# Patient Record
Sex: MALE | Race: WHITE | Employment: FULL TIME | URBAN - METROPOLITAN AREA
[De-identification: names, ages, dates, MRNs, and addresses within clinical notes are randomized per-mention and may not be internally consistent; named-entity substitution may affect disease eponyms.]

---

## 2024-06-11 ENCOUNTER — HOSPITAL ENCOUNTER (INPATIENT)
Facility: HOSPITAL | Age: 49
LOS: 2 days | Discharge: HOME | DRG: 897 | End: 2024-06-13
Attending: STUDENT IN AN ORGANIZED HEALTH CARE EDUCATION/TRAINING PROGRAM | Admitting: INTERNAL MEDICINE
Payer: COMMERCIAL

## 2024-06-11 ENCOUNTER — APPOINTMENT (OUTPATIENT)
Facility: HOSPITAL | Age: 49
DRG: 897 | End: 2024-06-11
Payer: COMMERCIAL

## 2024-06-11 DIAGNOSIS — E83.42 HYPOMAGNESEMIA: ICD-10-CM

## 2024-06-11 DIAGNOSIS — R79.89 ELEVATED LFTS: ICD-10-CM

## 2024-06-11 DIAGNOSIS — I42.9 CARDIOMYOPATHY, UNSPECIFIED TYPE (HCC): ICD-10-CM

## 2024-06-11 DIAGNOSIS — D64.9 ANEMIA, UNSPECIFIED TYPE: ICD-10-CM

## 2024-06-11 DIAGNOSIS — R42 DIZZINESS: ICD-10-CM

## 2024-06-11 DIAGNOSIS — R79.89 LOW TSH LEVEL: ICD-10-CM

## 2024-06-11 DIAGNOSIS — F10.939 ALCOHOL WITHDRAWAL SYNDROME WITH COMPLICATION (HCC): Primary | ICD-10-CM

## 2024-06-11 PROBLEM — F10.930 ALCOHOL WITHDRAWAL SYNDROME, UNCOMPLICATED (HCC): Status: ACTIVE | Noted: 2024-06-11

## 2024-06-11 PROBLEM — R26.0 ATAXIC GAIT: Status: ACTIVE | Noted: 2024-06-11

## 2024-06-11 PROBLEM — E87.1 HYPONATREMIA: Status: ACTIVE | Noted: 2024-06-11

## 2024-06-11 PROBLEM — I10 HTN (HYPERTENSION), BENIGN: Status: ACTIVE | Noted: 2024-06-11

## 2024-06-11 PROBLEM — D50.9 MICROCYTIC ANEMIA: Status: ACTIVE | Noted: 2024-06-11

## 2024-06-11 PROBLEM — E11.9 DM (DIABETES MELLITUS), TYPE 2 (HCC): Status: ACTIVE | Noted: 2024-06-11

## 2024-06-11 PROBLEM — E78.5 HYPERLIPIDEMIA: Status: ACTIVE | Noted: 2024-06-11

## 2024-06-11 PROBLEM — E03.9 HYPOTHYROIDISM: Status: ACTIVE | Noted: 2024-06-11

## 2024-06-11 LAB
ALBUMIN SERPL-MCNC: 3.3 G/DL (ref 3.5–5)
ALBUMIN/GLOB SERPL: 0.6 (ref 1.1–2.2)
ALP SERPL-CCNC: 167 U/L (ref 45–117)
ALT SERPL-CCNC: 102 U/L (ref 12–78)
ANION GAP SERPL CALC-SCNC: 11 MMOL/L (ref 5–15)
AST SERPL-CCNC: 145 U/L (ref 15–37)
BASOPHILS # BLD: 0 K/UL (ref 0–0.1)
BASOPHILS NFR BLD: 0 % (ref 0–1)
BILIRUB SERPL-MCNC: 0.7 MG/DL (ref 0.2–1)
BUN SERPL-MCNC: 8 MG/DL (ref 6–20)
BUN/CREAT SERPL: 8 (ref 12–20)
CALCIUM SERPL-MCNC: 9.6 MG/DL (ref 8.5–10.1)
CHLORIDE SERPL-SCNC: 96 MMOL/L (ref 97–108)
CO2 SERPL-SCNC: 26 MMOL/L (ref 21–32)
COMMENT:: NORMAL
CREAT SERPL-MCNC: 1 MG/DL (ref 0.7–1.3)
DIFFERENTIAL METHOD BLD: ABNORMAL
EOSINOPHIL # BLD: 0 K/UL (ref 0–0.4)
EOSINOPHIL NFR BLD: 1 % (ref 0–7)
ERYTHROCYTE [DISTWIDTH] IN BLOOD BY AUTOMATED COUNT: 18.5 % (ref 11.5–14.5)
ETHANOL SERPL-MCNC: 64 MG/DL (ref 0–0.08)
GLOBULIN SER CALC-MCNC: 5.1 G/DL (ref 2–4)
GLUCOSE BLD STRIP.AUTO-MCNC: 144 MG/DL (ref 65–117)
GLUCOSE SERPL-MCNC: 132 MG/DL (ref 65–100)
HCT VFR BLD AUTO: 23.4 % (ref 36.6–50.3)
HEMOCCULT STL QL: NEGATIVE
HGB BLD-MCNC: 6.9 G/DL (ref 12.1–17)
HISTORY CHECK: NORMAL
IMM GRANULOCYTES # BLD AUTO: 0 K/UL
IMM GRANULOCYTES NFR BLD AUTO: 0 %
INR PPP: 1 (ref 0.9–1.1)
LYMPHOCYTES # BLD: 0.9 K/UL (ref 0.8–3.5)
LYMPHOCYTES NFR BLD: 21 % (ref 12–49)
MAGNESIUM SERPL-MCNC: 1.4 MG/DL (ref 1.6–2.4)
MCH RBC QN AUTO: 18.9 PG (ref 26–34)
MCHC RBC AUTO-ENTMCNC: 29.5 G/DL (ref 30–36.5)
MCV RBC AUTO: 63.9 FL (ref 80–99)
MONOCYTES # BLD: 0.3 K/UL (ref 0–1)
MONOCYTES NFR BLD: 6 % (ref 5–13)
NEUTS SEG # BLD: 3.1 K/UL (ref 1.8–8)
NEUTS SEG NFR BLD: 72 % (ref 32–75)
NRBC # BLD: 0 K/UL (ref 0–0.01)
NRBC BLD-RTO: 0 PER 100 WBC
PLATELET # BLD AUTO: 177 K/UL (ref 150–400)
PMV BLD AUTO: 9.1 FL (ref 8.9–12.9)
POTASSIUM SERPL-SCNC: 3.9 MMOL/L (ref 3.5–5.1)
PROT SERPL-MCNC: 8.4 G/DL (ref 6.4–8.2)
PROTHROMBIN TIME: 10.4 SEC (ref 9–11.1)
RBC # BLD AUTO: 3.66 M/UL (ref 4.1–5.7)
RBC MORPH BLD: ABNORMAL
SERVICE CMNT-IMP: ABNORMAL
SODIUM SERPL-SCNC: 133 MMOL/L (ref 136–145)
SPECIMEN HOLD: NORMAL
TROPONIN I SERPL HS-MCNC: 8 NG/L (ref 0–76)
TSH SERPL DL<=0.05 MIU/L-ACNC: 0.08 UIU/ML (ref 0.36–3.74)
WBC # BLD AUTO: 4.3 K/UL (ref 4.1–11.1)

## 2024-06-11 PROCEDURE — 96375 TX/PRO/DX INJ NEW DRUG ADDON: CPT

## 2024-06-11 PROCEDURE — 96365 THER/PROPH/DIAG IV INF INIT: CPT

## 2024-06-11 PROCEDURE — G0378 HOSPITAL OBSERVATION PER HR: HCPCS

## 2024-06-11 PROCEDURE — 82962 GLUCOSE BLOOD TEST: CPT

## 2024-06-11 PROCEDURE — 70498 CT ANGIOGRAPHY NECK: CPT

## 2024-06-11 PROCEDURE — 84443 ASSAY THYROID STIM HORMONE: CPT

## 2024-06-11 PROCEDURE — 4A03X5D MEASUREMENT OF ARTERIAL FLOW, INTRACRANIAL, EXTERNAL APPROACH: ICD-10-PCS | Performed by: RADIOLOGY

## 2024-06-11 PROCEDURE — 6360000002 HC RX W HCPCS: Performed by: STUDENT IN AN ORGANIZED HEALTH CARE EDUCATION/TRAINING PROGRAM

## 2024-06-11 PROCEDURE — P9040 RBC LEUKOREDUCED IRRADIATED: HCPCS

## 2024-06-11 PROCEDURE — 99285 EMERGENCY DEPT VISIT HI MDM: CPT

## 2024-06-11 PROCEDURE — 70450 CT HEAD/BRAIN W/O DYE: CPT

## 2024-06-11 PROCEDURE — 84439 ASSAY OF FREE THYROXINE: CPT

## 2024-06-11 PROCEDURE — 30233N1 TRANSFUSION OF NONAUTOLOGOUS RED BLOOD CELLS INTO PERIPHERAL VEIN, PERCUTANEOUS APPROACH: ICD-10-PCS | Performed by: INTERNAL MEDICINE

## 2024-06-11 PROCEDURE — 83735 ASSAY OF MAGNESIUM: CPT

## 2024-06-11 PROCEDURE — 82272 OCCULT BLD FECES 1-3 TESTS: CPT

## 2024-06-11 PROCEDURE — P9016 RBC LEUKOCYTES REDUCED: HCPCS

## 2024-06-11 PROCEDURE — 85610 PROTHROMBIN TIME: CPT

## 2024-06-11 PROCEDURE — 86850 RBC ANTIBODY SCREEN: CPT

## 2024-06-11 PROCEDURE — 86901 BLOOD TYPING SEROLOGIC RH(D): CPT

## 2024-06-11 PROCEDURE — 80053 COMPREHEN METABOLIC PANEL: CPT

## 2024-06-11 PROCEDURE — 82077 ASSAY SPEC XCP UR&BREATH IA: CPT

## 2024-06-11 PROCEDURE — 1100000000 HC RM PRIVATE

## 2024-06-11 PROCEDURE — 6360000004 HC RX CONTRAST MEDICATION: Performed by: STUDENT IN AN ORGANIZED HEALTH CARE EDUCATION/TRAINING PROGRAM

## 2024-06-11 PROCEDURE — 84484 ASSAY OF TROPONIN QUANT: CPT

## 2024-06-11 PROCEDURE — 85025 COMPLETE CBC W/AUTO DIFF WBC: CPT

## 2024-06-11 PROCEDURE — 36415 COLL VENOUS BLD VENIPUNCTURE: CPT

## 2024-06-11 PROCEDURE — 86923 COMPATIBILITY TEST ELECTRIC: CPT

## 2024-06-11 PROCEDURE — 36430 TRANSFUSION BLD/BLD COMPNT: CPT

## 2024-06-11 PROCEDURE — 93005 ELECTROCARDIOGRAM TRACING: CPT | Performed by: STUDENT IN AN ORGANIZED HEALTH CARE EDUCATION/TRAINING PROGRAM

## 2024-06-11 PROCEDURE — 86900 BLOOD TYPING SEROLOGIC ABO: CPT

## 2024-06-11 RX ORDER — DIAZEPAM 5 MG/ML
5 INJECTION, SOLUTION INTRAMUSCULAR; INTRAVENOUS ONCE
Status: COMPLETED | OUTPATIENT
Start: 2024-06-11 | End: 2024-06-11

## 2024-06-11 RX ORDER — ACETAMINOPHEN 325 MG/1
650 TABLET ORAL EVERY 6 HOURS PRN
Status: DISCONTINUED | OUTPATIENT
Start: 2024-06-11 | End: 2024-06-13 | Stop reason: HOSPADM

## 2024-06-11 RX ORDER — MAGNESIUM SULFATE IN WATER 40 MG/ML
2000 INJECTION, SOLUTION INTRAVENOUS ONCE
Status: COMPLETED | OUTPATIENT
Start: 2024-06-11 | End: 2024-06-11

## 2024-06-11 RX ORDER — DIAZEPAM 5 MG/ML
5 INJECTION, SOLUTION INTRAMUSCULAR; INTRAVENOUS EVERY 4 HOURS PRN
Status: DISCONTINUED | OUTPATIENT
Start: 2024-06-11 | End: 2024-06-11

## 2024-06-11 RX ORDER — SODIUM CHLORIDE 0.9 % (FLUSH) 0.9 %
5-40 SYRINGE (ML) INJECTION PRN
Status: DISCONTINUED | OUTPATIENT
Start: 2024-06-11 | End: 2024-06-12

## 2024-06-11 RX ORDER — LORAZEPAM 1 MG/1
3 TABLET ORAL
Status: DISCONTINUED | OUTPATIENT
Start: 2024-06-11 | End: 2024-06-12

## 2024-06-11 RX ORDER — MULTIVITAMIN WITH IRON
1 TABLET ORAL DAILY
Status: DISCONTINUED | OUTPATIENT
Start: 2024-06-12 | End: 2024-06-13 | Stop reason: HOSPADM

## 2024-06-11 RX ORDER — DIAZEPAM 5 MG/1
15 TABLET ORAL
Status: DISCONTINUED | OUTPATIENT
Start: 2024-06-11 | End: 2024-06-11

## 2024-06-11 RX ORDER — PANTOPRAZOLE SODIUM 40 MG/1
40 TABLET, DELAYED RELEASE ORAL
Status: DISCONTINUED | OUTPATIENT
Start: 2024-06-12 | End: 2024-06-12

## 2024-06-11 RX ORDER — DIAZEPAM 5 MG/ML
5 INJECTION, SOLUTION INTRAMUSCULAR; INTRAVENOUS
Status: DISCONTINUED | OUTPATIENT
Start: 2024-06-11 | End: 2024-06-12

## 2024-06-11 RX ORDER — SILDENAFIL CITRATE 20 MG/1
20 TABLET ORAL 3 TIMES DAILY PRN
COMMUNITY
Start: 2024-06-07

## 2024-06-11 RX ORDER — DIAZEPAM 5 MG/ML
20 INJECTION, SOLUTION INTRAMUSCULAR; INTRAVENOUS
Status: DISCONTINUED | OUTPATIENT
Start: 2024-06-11 | End: 2024-06-12

## 2024-06-11 RX ORDER — GAUZE BANDAGE 2" X 2"
100 BANDAGE TOPICAL DAILY
Status: DISCONTINUED | OUTPATIENT
Start: 2024-06-12 | End: 2024-06-13 | Stop reason: HOSPADM

## 2024-06-11 RX ORDER — ONDANSETRON 4 MG/1
4 TABLET, ORALLY DISINTEGRATING ORAL EVERY 8 HOURS PRN
Status: DISCONTINUED | OUTPATIENT
Start: 2024-06-11 | End: 2024-06-13 | Stop reason: HOSPADM

## 2024-06-11 RX ORDER — DIAZEPAM 5 MG/1
5 TABLET ORAL
Status: DISCONTINUED | OUTPATIENT
Start: 2024-06-11 | End: 2024-06-11

## 2024-06-11 RX ORDER — ACETAMINOPHEN 650 MG/1
650 SUPPOSITORY RECTAL EVERY 6 HOURS PRN
Status: DISCONTINUED | OUTPATIENT
Start: 2024-06-11 | End: 2024-06-12

## 2024-06-11 RX ORDER — MAGNESIUM SULFATE IN WATER 40 MG/ML
2000 INJECTION, SOLUTION INTRAVENOUS PRN
Status: DISCONTINUED | OUTPATIENT
Start: 2024-06-11 | End: 2024-06-13 | Stop reason: HOSPADM

## 2024-06-11 RX ORDER — DIAZEPAM 5 MG/1
20 TABLET ORAL
Status: DISCONTINUED | OUTPATIENT
Start: 2024-06-11 | End: 2024-06-11

## 2024-06-11 RX ORDER — SODIUM CHLORIDE 9 MG/ML
INJECTION, SOLUTION INTRAVENOUS PRN
Status: DISCONTINUED | OUTPATIENT
Start: 2024-06-11 | End: 2024-06-12

## 2024-06-11 RX ORDER — ONDANSETRON 2 MG/ML
4 INJECTION INTRAMUSCULAR; INTRAVENOUS EVERY 6 HOURS PRN
Status: DISCONTINUED | OUTPATIENT
Start: 2024-06-11 | End: 2024-06-12

## 2024-06-11 RX ORDER — SODIUM CHLORIDE 9 MG/ML
INJECTION, SOLUTION INTRAVENOUS PRN
Status: DISCONTINUED | OUTPATIENT
Start: 2024-06-11 | End: 2024-06-13 | Stop reason: HOSPADM

## 2024-06-11 RX ORDER — ZOLPIDEM TARTRATE 10 MG/1
10 TABLET ORAL NIGHTLY PRN
COMMUNITY
Start: 2024-04-28

## 2024-06-11 RX ORDER — DIAZEPAM 5 MG/1
10 TABLET ORAL
Status: DISCONTINUED | OUTPATIENT
Start: 2024-06-11 | End: 2024-06-11

## 2024-06-11 RX ORDER — SODIUM CHLORIDE 0.9 % (FLUSH) 0.9 %
5-40 SYRINGE (ML) INJECTION EVERY 12 HOURS SCHEDULED
Status: DISCONTINUED | OUTPATIENT
Start: 2024-06-11 | End: 2024-06-12

## 2024-06-11 RX ORDER — LORAZEPAM 1 MG/1
2 TABLET ORAL
Status: DISCONTINUED | OUTPATIENT
Start: 2024-06-11 | End: 2024-06-12

## 2024-06-11 RX ORDER — LORAZEPAM 1 MG/1
1 TABLET ORAL
Status: DISCONTINUED | OUTPATIENT
Start: 2024-06-11 | End: 2024-06-12

## 2024-06-11 RX ORDER — ESCITALOPRAM OXALATE 20 MG/1
20 TABLET ORAL DAILY
COMMUNITY

## 2024-06-11 RX ORDER — DIAZEPAM 5 MG/ML
15 INJECTION, SOLUTION INTRAMUSCULAR; INTRAVENOUS
Status: DISCONTINUED | OUTPATIENT
Start: 2024-06-11 | End: 2024-06-12

## 2024-06-11 RX ORDER — DIAZEPAM 5 MG/ML
10 INJECTION, SOLUTION INTRAMUSCULAR; INTRAVENOUS
Status: DISCONTINUED | OUTPATIENT
Start: 2024-06-11 | End: 2024-06-12

## 2024-06-11 RX ORDER — SODIUM CHLORIDE 0.9 % (FLUSH) 0.9 %
5-40 SYRINGE (ML) INJECTION EVERY 12 HOURS SCHEDULED
Status: DISCONTINUED | OUTPATIENT
Start: 2024-06-11 | End: 2024-06-13 | Stop reason: HOSPADM

## 2024-06-11 RX ORDER — POLYETHYLENE GLYCOL 3350 17 G/17G
17 POWDER, FOR SOLUTION ORAL DAILY PRN
Status: DISCONTINUED | OUTPATIENT
Start: 2024-06-11 | End: 2024-06-13 | Stop reason: HOSPADM

## 2024-06-11 RX ORDER — SODIUM CHLORIDE 0.9 % (FLUSH) 0.9 %
5-40 SYRINGE (ML) INJECTION PRN
Status: DISCONTINUED | OUTPATIENT
Start: 2024-06-11 | End: 2024-06-13 | Stop reason: HOSPADM

## 2024-06-11 RX ORDER — LORAZEPAM 1 MG/1
4 TABLET ORAL
Status: DISCONTINUED | OUTPATIENT
Start: 2024-06-11 | End: 2024-06-12

## 2024-06-11 RX ORDER — SEMAGLUTIDE 1.34 MG/ML
1 INJECTION, SOLUTION SUBCUTANEOUS
Status: ON HOLD | COMMUNITY
Start: 2024-04-17 | End: 2024-06-12 | Stop reason: HOSPADM

## 2024-06-11 RX ADMIN — IOPAMIDOL 100 ML: 755 INJECTION, SOLUTION INTRAVENOUS at 19:19

## 2024-06-11 RX ADMIN — MAGNESIUM SULFATE HEPTAHYDRATE 2000 MG: 40 INJECTION, SOLUTION INTRAVENOUS at 20:10

## 2024-06-11 RX ADMIN — DIAZEPAM 5 MG: 10 INJECTION, SOLUTION INTRAMUSCULAR; INTRAVENOUS at 23:08

## 2024-06-11 ASSESSMENT — LIFESTYLE VARIABLES
HOW MANY STANDARD DRINKS CONTAINING ALCOHOL DO YOU HAVE ON A TYPICAL DAY: 1 OR 2
HOW OFTEN DO YOU HAVE A DRINK CONTAINING ALCOHOL: 4 OR MORE TIMES A WEEK

## 2024-06-11 NOTE — ED TRIAGE NOTES
Signs and symptoms: for at least 6 months I have had intermittent episodes where I feel weird people described I'm acting like I'm drinking I get lethargic and sleep a lot and I get off balance and dizzy. This episode started this morning at 9am.  Code Stroke activation time: No code S per DR Sheriff  Provider at bedside time:  1810  VAN score: Negative  Last Known Well (Time): today 9an  Blood Glucose Result/Time: 144  Blood Pressure: 155/84  Anticoagulants (List medications): none

## 2024-06-11 NOTE — ED PROVIDER NOTES
[facility] EMERGENCY DEPT  EMERGENCY DEPARTMENT ENCOUNTER      Pt Name: Troy Aguilar  MRN: [id number]  Birthdate 1975  Date of evaluation: 6/11/2024  Provider: Samantha Sheriff MD    CHIEF COMPLAINT       Chief Complaint   Patient presents with    Fatigue    Dizziness     HISTORY OF PRESENT ILLNESS   (Location/Symptom, Timing/Onset, Context/Setting, Quality, Duration, Modifying Factors, Severity)  Note limiting factors.   HPI  49-year-old male with past medical history of hypertension, hyperlipidemia, diabetes, presents to the ER for evaluation of intermittent episodes of gait instability associated with dizziness- with people around him observing patient swaying with while walking.  Patient also reports feeling lethargic after each of these episodes are over.  Each episode is associated with nausea and vomiting.  Patient reports last episode was 9AM this morning. Episode has now resolved, and patient was able to ambulate independently with steady gait into the ER without difficulty. He denies any headache, vision changes, weakness, numbness, facial droop, speech deficits, or any other neurologic symptoms of concern.  He endorses pulsatile tinnitus at times when episodes occur- \"I can feel my heartbeat in my ear.\" Wife reports approximately 6 months ago, patient fell outside, and may have hit his head as he was found passed out in the parking lot and was very lethargic afterwards. Patient was not evaluated at that time. They think there's a possibility his current symptoms may have begun after that. Patient is not on AC. Patient shares that these episodes have led to people thinking he's drinking even though he's not.     Review of External Medical Records:     Nursing Notes were reviewed.    REVIEW OF SYSTEMS    (2-9 systems for level 4, 10 or more for level 5)     Review of Systems   All other systems reviewed and are negative.      Except as noted above the remainder of the review of systems was reviewed and

## 2024-06-12 ENCOUNTER — APPOINTMENT (OUTPATIENT)
Facility: HOSPITAL | Age: 49
DRG: 897 | End: 2024-06-12
Payer: COMMERCIAL

## 2024-06-12 PROBLEM — E66.9 OBESE: Status: ACTIVE | Noted: 2024-06-12

## 2024-06-12 PROBLEM — R53.83 FATIGUE: Status: ACTIVE | Noted: 2024-06-12

## 2024-06-12 PROBLEM — F10.10 ALCOHOL ABUSE: Status: ACTIVE | Noted: 2024-06-12

## 2024-06-12 PROBLEM — F32.A ANXIETY AND DEPRESSION: Status: ACTIVE | Noted: 2024-06-12

## 2024-06-12 PROBLEM — F41.9 ANXIETY AND DEPRESSION: Status: ACTIVE | Noted: 2024-06-12

## 2024-06-12 PROBLEM — G47.00 INSOMNIA: Status: ACTIVE | Noted: 2024-06-12

## 2024-06-12 LAB
-: NORMAL
ABO + RH BLD: NORMAL
ALBUMIN SERPL-MCNC: 3.1 G/DL (ref 3.5–5)
ALBUMIN/GLOB SERPL: 0.7 (ref 1.1–2.2)
ALP SERPL-CCNC: 141 U/L (ref 45–117)
ALT SERPL-CCNC: 84 U/L (ref 12–78)
AMPHET UR QL SCN: NEGATIVE
ANION GAP SERPL CALC-SCNC: 5 MMOL/L (ref 5–15)
APPEARANCE UR: CLEAR
AST SERPL-CCNC: 114 U/L (ref 15–37)
BARBITURATES UR QL SCN: NEGATIVE
BASOPHILS # BLD: 0 K/UL (ref 0–0.1)
BASOPHILS NFR BLD: 1 % (ref 0–1)
BENZODIAZ UR QL: NEGATIVE
BILIRUB SERPL-MCNC: 1.7 MG/DL (ref 0.2–1)
BILIRUB UR QL: NEGATIVE
BLD PROD TYP BPU: NORMAL
BLOOD BANK BLOOD PRODUCT EXPIRATION DATE: NORMAL
BLOOD BANK DISPENSE STATUS: NORMAL
BLOOD BANK ISBT PRODUCT BLOOD TYPE: 9500
BLOOD BANK PRODUCT CODE: NORMAL
BLOOD BANK UNIT TYPE AND RH: NORMAL
BLOOD GROUP ANTIBODIES SERPL: NORMAL
BPU ID: NORMAL
BUN SERPL-MCNC: 11 MG/DL (ref 6–20)
BUN/CREAT SERPL: 14 (ref 12–20)
CALCIUM SERPL-MCNC: 9.3 MG/DL (ref 8.5–10.1)
CANNABINOIDS UR QL SCN: NEGATIVE
CHLORIDE SERPL-SCNC: 101 MMOL/L (ref 97–108)
CO2 SERPL-SCNC: 29 MMOL/L (ref 21–32)
COCAINE UR QL SCN: NEGATIVE
COLOR UR: ABNORMAL
COMMENT:: NORMAL
CREAT SERPL-MCNC: 0.79 MG/DL (ref 0.7–1.3)
CREAT UR-MCNC: 97 MG/DL
CROSSMATCH RESULT: NORMAL
D DIMER PPP FEU-MCNC: 1.14 MG/L FEU (ref 0–0.65)
DIFFERENTIAL METHOD BLD: ABNORMAL
EOSINOPHIL # BLD: 0.1 K/UL (ref 0–0.4)
EOSINOPHIL NFR BLD: 2 % (ref 0–7)
ERYTHROCYTE [DISTWIDTH] IN BLOOD BY AUTOMATED COUNT: 19.3 % (ref 11.5–14.5)
EST. AVERAGE GLUCOSE BLD GHB EST-MCNC: 105 MG/DL
FERRITIN SERPL-MCNC: 8 NG/ML (ref 26–388)
FLUAV RNA SPEC QL NAA+PROBE: NOT DETECTED
FLUBV RNA SPEC QL NAA+PROBE: NOT DETECTED
FOLATE SERPL-MCNC: 7.4 NG/ML (ref 5–21)
GLOBULIN SER CALC-MCNC: 4.5 G/DL (ref 2–4)
GLUCOSE SERPL-MCNC: 107 MG/DL (ref 65–100)
GLUCOSE UR STRIP.AUTO-MCNC: 100 MG/DL
HAV IGM SER QL: NONREACTIVE
HBA1C MFR BLD: 5.3 % (ref 4–5.6)
HBV CORE IGM SER QL: NONREACTIVE
HBV SURFACE AG SER QL: <0.1 INDEX
HBV SURFACE AG SER QL: NEGATIVE
HCT VFR BLD AUTO: 22.9 % (ref 36.6–50.3)
HCT VFR BLD AUTO: 23.8 % (ref 36.6–50.3)
HCV AB SER IA-ACNC: <0.02 INDEX
HCV AB SERPL QL IA: NONREACTIVE
HGB BLD-MCNC: 6.8 G/DL (ref 12.1–17)
HGB BLD-MCNC: 7 G/DL (ref 12.1–17)
HGB UR QL STRIP: NEGATIVE
IMM GRANULOCYTES # BLD AUTO: 0 K/UL (ref 0–0.04)
IMM GRANULOCYTES NFR BLD AUTO: 0 % (ref 0–0.5)
INR PPP: 1.2 (ref 0.9–1.1)
IRON SATN MFR SERPL: 18 % (ref 20–50)
IRON SERPL-MCNC: 92 UG/DL (ref 35–150)
KETONES UR QL STRIP.AUTO: NEGATIVE MG/DL
LEUKOCYTE ESTERASE UR QL STRIP.AUTO: NEGATIVE
LYMPHOCYTES # BLD: 0.4 K/UL (ref 0.8–3.5)
LYMPHOCYTES NFR BLD: 12 % (ref 12–49)
Lab: NORMAL
MAGNESIUM SERPL-MCNC: 1.7 MG/DL (ref 1.6–2.4)
MCH RBC QN AUTO: 19.7 PG (ref 26–34)
MCHC RBC AUTO-ENTMCNC: 29.7 G/DL (ref 30–36.5)
MCV RBC AUTO: 66.2 FL (ref 80–99)
METHADONE UR QL: NEGATIVE
MONOCYTES # BLD: 0.5 K/UL (ref 0–1)
MONOCYTES NFR BLD: 13 % (ref 5–13)
NEUTS SEG # BLD: 2.6 K/UL (ref 1.8–8)
NEUTS SEG NFR BLD: 72 % (ref 32–75)
NITRITE UR QL STRIP.AUTO: NEGATIVE
NRBC # BLD: 0 K/UL (ref 0–0.01)
NRBC BLD-RTO: 0 PER 100 WBC
OPIATES UR QL: NEGATIVE
PCP UR QL: NEGATIVE
PH UR STRIP: 8 (ref 5–8)
PHOSPHATE SERPL-MCNC: 2.3 MG/DL (ref 2.6–4.7)
PLATELET # BLD AUTO: 145 K/UL (ref 150–400)
PMV BLD AUTO: 8.8 FL (ref 8.9–12.9)
POTASSIUM SERPL-SCNC: 4.1 MMOL/L (ref 3.5–5.1)
PROCALCITONIN SERPL-MCNC: 0.09 NG/ML
PROT SERPL-MCNC: 7.6 G/DL (ref 6.4–8.2)
PROT UR STRIP-MCNC: NEGATIVE MG/DL
PROT UR-MCNC: 14 MG/DL (ref 0–11.9)
PROTHROMBIN TIME: 11.9 SEC (ref 9–11.1)
RBC # BLD AUTO: 3.46 M/UL (ref 4.1–5.7)
RBC MORPH BLD: ABNORMAL
SARS-COV-2 RNA RESP QL NAA+PROBE: NOT DETECTED
SODIUM SERPL-SCNC: 135 MMOL/L (ref 136–145)
SODIUM UR-SCNC: 91 MMOL/L
SP GR UR REFRACTOMETRY: 1.02 (ref 1–1.03)
SPECIMEN EXP DATE BLD: NORMAL
SPECIMEN HOLD: NORMAL
T4 FREE SERPL-MCNC: 1.4 NG/DL (ref 0.8–1.5)
TIBC SERPL-MCNC: 508 UG/DL (ref 250–450)
UNIT DIVISION: 0
UNIT ISSUE DATE/TIME: NORMAL
UROBILINOGEN UR QL STRIP.AUTO: 1 EU/DL (ref 0.2–1)
VIT B12 SERPL-MCNC: 599 PG/ML (ref 193–986)
WBC # BLD AUTO: 3.6 K/UL (ref 4.1–11.1)

## 2024-06-12 PROCEDURE — 84100 ASSAY OF PHOSPHORUS: CPT

## 2024-06-12 PROCEDURE — 80053 COMPREHEN METABOLIC PANEL: CPT

## 2024-06-12 PROCEDURE — 96375 TX/PRO/DX INJ NEW DRUG ADDON: CPT

## 2024-06-12 PROCEDURE — 80307 DRUG TEST PRSMV CHEM ANLYZR: CPT

## 2024-06-12 PROCEDURE — 82607 VITAMIN B-12: CPT

## 2024-06-12 PROCEDURE — 85018 HEMOGLOBIN: CPT

## 2024-06-12 PROCEDURE — 82570 ASSAY OF URINE CREATININE: CPT

## 2024-06-12 PROCEDURE — 99223 1ST HOSP IP/OBS HIGH 75: CPT | Performed by: NURSE PRACTITIONER

## 2024-06-12 PROCEDURE — 95819 EEG AWAKE AND ASLEEP: CPT

## 2024-06-12 PROCEDURE — 83550 IRON BINDING TEST: CPT

## 2024-06-12 PROCEDURE — G0378 HOSPITAL OBSERVATION PER HR: HCPCS

## 2024-06-12 PROCEDURE — 2060000000 HC ICU INTERMEDIATE R&B

## 2024-06-12 PROCEDURE — 80074 ACUTE HEPATITIS PANEL: CPT

## 2024-06-12 PROCEDURE — 85025 COMPLETE CBC W/AUTO DIFF WBC: CPT

## 2024-06-12 PROCEDURE — 2580000003 HC RX 258: Performed by: INTERNAL MEDICINE

## 2024-06-12 PROCEDURE — 6370000000 HC RX 637 (ALT 250 FOR IP): Performed by: INTERNAL MEDICINE

## 2024-06-12 PROCEDURE — 85379 FIBRIN DEGRADATION QUANT: CPT

## 2024-06-12 PROCEDURE — 87205 SMEAR GRAM STAIN: CPT

## 2024-06-12 PROCEDURE — 6370000000 HC RX 637 (ALT 250 FOR IP): Performed by: FAMILY MEDICINE

## 2024-06-12 PROCEDURE — 87086 URINE CULTURE/COLONY COUNT: CPT

## 2024-06-12 PROCEDURE — 82728 ASSAY OF FERRITIN: CPT

## 2024-06-12 PROCEDURE — 94761 N-INVAS EAR/PLS OXIMETRY MLT: CPT

## 2024-06-12 PROCEDURE — 83540 ASSAY OF IRON: CPT

## 2024-06-12 PROCEDURE — 85014 HEMATOCRIT: CPT

## 2024-06-12 PROCEDURE — 82746 ASSAY OF FOLIC ACID SERUM: CPT

## 2024-06-12 PROCEDURE — 84300 ASSAY OF URINE SODIUM: CPT

## 2024-06-12 PROCEDURE — 76705 ECHO EXAM OF ABDOMEN: CPT

## 2024-06-12 PROCEDURE — 83036 HEMOGLOBIN GLYCOSYLATED A1C: CPT

## 2024-06-12 PROCEDURE — 6360000002 HC RX W HCPCS: Performed by: INTERNAL MEDICINE

## 2024-06-12 PROCEDURE — 36415 COLL VENOUS BLD VENIPUNCTURE: CPT

## 2024-06-12 PROCEDURE — 84145 PROCALCITONIN (PCT): CPT

## 2024-06-12 PROCEDURE — 83735 ASSAY OF MAGNESIUM: CPT

## 2024-06-12 PROCEDURE — 96376 TX/PRO/DX INJ SAME DRUG ADON: CPT

## 2024-06-12 PROCEDURE — 87636 SARSCOV2 & INF A&B AMP PRB: CPT

## 2024-06-12 PROCEDURE — 85610 PROTHROMBIN TIME: CPT

## 2024-06-12 PROCEDURE — 81002 URINALYSIS NONAUTO W/O SCOPE: CPT

## 2024-06-12 PROCEDURE — 84156 ASSAY OF PROTEIN URINE: CPT

## 2024-06-12 RX ORDER — FERROUS GLUCONATE 324(38)MG
324 TABLET ORAL
Status: DISCONTINUED | OUTPATIENT
Start: 2024-06-16 | End: 2024-06-13 | Stop reason: HOSPADM

## 2024-06-12 RX ORDER — PANTOPRAZOLE SODIUM 40 MG/1
40 TABLET, DELAYED RELEASE ORAL
Qty: 30 TABLET | Refills: 3 | Status: SHIPPED | OUTPATIENT
Start: 2024-06-12

## 2024-06-12 RX ORDER — THIAMINE MONONITRATE (VIT B1) 100 MG
100 TABLET ORAL DAILY
Refills: 0 | Status: SHIPPED | COMMUNITY
Start: 2024-06-13 | End: 2024-07-13

## 2024-06-12 RX ORDER — ESCITALOPRAM OXALATE 10 MG/1
20 TABLET ORAL DAILY
Status: DISCONTINUED | OUTPATIENT
Start: 2024-06-12 | End: 2024-06-13 | Stop reason: HOSPADM

## 2024-06-12 RX ORDER — FERROUS GLUCONATE 324(38)MG
324 TABLET ORAL
Qty: 30 TABLET | Refills: 3 | Status: SHIPPED | OUTPATIENT
Start: 2024-06-16

## 2024-06-12 RX ORDER — LEVOTHYROXINE SODIUM 0.1 MG/1
100 TABLET ORAL
Status: DISCONTINUED | OUTPATIENT
Start: 2024-06-13 | End: 2024-06-13 | Stop reason: HOSPADM

## 2024-06-12 RX ORDER — PANTOPRAZOLE SODIUM 40 MG/1
40 TABLET, DELAYED RELEASE ORAL
Status: DISCONTINUED | OUTPATIENT
Start: 2024-06-12 | End: 2024-06-13 | Stop reason: HOSPADM

## 2024-06-12 RX ORDER — MULTIVITAMIN WITH IRON
1 TABLET ORAL DAILY
Qty: 30 TABLET | Refills: 0 | Status: SHIPPED | COMMUNITY
Start: 2024-06-13 | End: 2024-07-13

## 2024-06-12 RX ORDER — LEVOTHYROXINE SODIUM 0.15 MG/1
150 TABLET ORAL DAILY
Qty: 30 TABLET | Refills: 0 | Status: SHIPPED | OUTPATIENT
Start: 2024-06-12 | End: 2024-07-12

## 2024-06-12 RX ORDER — LOSARTAN POTASSIUM 25 MG/1
25 TABLET ORAL DAILY
Status: DISCONTINUED | OUTPATIENT
Start: 2024-06-12 | End: 2024-06-13 | Stop reason: HOSPADM

## 2024-06-12 RX ORDER — ZOLPIDEM TARTRATE 5 MG/1
10 TABLET ORAL NIGHTLY PRN
Status: DISCONTINUED | OUTPATIENT
Start: 2024-06-12 | End: 2024-06-13 | Stop reason: HOSPADM

## 2024-06-12 RX ORDER — ATORVASTATIN CALCIUM 10 MG/1
10 TABLET, FILM COATED ORAL NIGHTLY
Status: DISCONTINUED | OUTPATIENT
Start: 2024-06-12 | End: 2024-06-13 | Stop reason: HOSPADM

## 2024-06-12 RX ORDER — BUTALBITAL, ACETAMINOPHEN AND CAFFEINE 50; 325; 40 MG/1; MG/1; MG/1
1 TABLET ORAL EVERY 4 HOURS PRN
Status: DISCONTINUED | OUTPATIENT
Start: 2024-06-12 | End: 2024-06-13 | Stop reason: HOSPADM

## 2024-06-12 RX ADMIN — ZOLPIDEM TARTRATE 10 MG: 5 TABLET ORAL at 01:30

## 2024-06-12 RX ADMIN — Medication 100 MG: at 10:32

## 2024-06-12 RX ADMIN — ZOLPIDEM TARTRATE 10 MG: 5 TABLET ORAL at 22:34

## 2024-06-12 RX ADMIN — IRON SUCROSE 100 MG: 20 INJECTION, SOLUTION INTRAVENOUS at 10:32

## 2024-06-12 RX ADMIN — THERA TABS 1 TABLET: TAB at 10:32

## 2024-06-12 RX ADMIN — DIAZEPAM 10 MG: 10 INJECTION, SOLUTION INTRAMUSCULAR; INTRAVENOUS at 08:11

## 2024-06-12 RX ADMIN — ATORVASTATIN CALCIUM 10 MG: 10 TABLET, FILM COATED ORAL at 22:34

## 2024-06-12 RX ADMIN — LOSARTAN POTASSIUM 25 MG: 25 TABLET, FILM COATED ORAL at 10:35

## 2024-06-12 RX ADMIN — ESCITALOPRAM OXALATE 20 MG: 10 TABLET ORAL at 10:35

## 2024-06-12 RX ADMIN — PANTOPRAZOLE SODIUM 40 MG: 40 TABLET, DELAYED RELEASE ORAL at 17:20

## 2024-06-12 RX ADMIN — SODIUM CHLORIDE, PRESERVATIVE FREE 10 ML: 5 INJECTION INTRAVENOUS at 02:47

## 2024-06-12 RX ADMIN — PANTOPRAZOLE SODIUM 40 MG: 40 TABLET, DELAYED RELEASE ORAL at 06:32

## 2024-06-12 RX ADMIN — POLYETHYLENE GLYCOL 3350, SODIUM SULFATE ANHYDROUS, SODIUM BICARBONATE, SODIUM CHLORIDE, POTASSIUM CHLORIDE 4000 ML: 236; 22.74; 6.74; 5.86; 2.97 POWDER, FOR SOLUTION ORAL at 17:20

## 2024-06-12 RX ADMIN — BUTALBITAL, ACETAMINOPHEN, AND CAFFEINE 1 TABLET: 325; 50; 40 TABLET ORAL at 13:39

## 2024-06-12 RX ADMIN — ACETAMINOPHEN 650 MG: 325 TABLET ORAL at 01:18

## 2024-06-12 NOTE — ED PROVIDER NOTES
I assumed care from Dr. Sheriff, the patient was transferred from [place] for a blood transfusion as he has severe microcytic anemia with hemoglobin 6.9.  No history of anemia requiring transfusion in the past.  Hemoccult negative stool.  Will transfuse 1 unit packed red cells.  Reports daily alcohol use (4 beers daily) now tachycardic and tremulous with CIWA score of 10 upon arrival.  I've ordered 5 mg IV Valium and he'll need to be on a CIWA protocol.  Would benefit from evaluation by GI for endoscopic investigation due to the possibility of an occult GIB causing acute blood loss anemia as there is no baseline hemoglobin available and he reports a pattern of substantial alcohol use.  Will admit for continued management, discussed with the hospitalist.    Informed Consent for Blood Component Transfusion Note    I have discussed with the patient the rationale for blood component transfusion; its benefits in treating or preventing fatigue, organ damage, or death; and its risk which includes mild transfusion reactions, rare risk of blood borne infection, or more serious but rare reactions. I have discussed the alternatives to transfusion, including the risk and consequences of not receiving transfusion. The patient had an opportunity to ask questions and had agreed to proceed with transfusion of blood components.    Electronically signed by Aranza Milton MD on 6/11/24 at 10:51 PM EDT    Perfect Serve Consult for Admission  11:04 PM    ED Room Number:   ER15/15  Patient Name and age:   Troy Aguilar 49 y.o.  male  Working Diagnosis:     1. Alcohol withdrawal syndrome with complication (HCC)    2. Dizziness    3. Anemia, unspecified type    4. Elevated LFTs    5. Low TSH level    6. Hypomagnesemia      COVID-19 Suspicion:   No  Sepsis present:    No   Code Status:     Full Code  Readmission:    No  Isolation Requirements:  no  Recommended LOC:   stepdown  Department:    [place] [facility] - [phone]  Other:      Transferred from [place] for blood transfusion, hemoccult negative stool.  Will transfuse 1 unit of packed red cells.  Reports daily alcohol use (4 beers daily) now tachycardic and tremulous with CIWA score of 10 upon arrival.  I've ordered 5 mg IV Valium and he'll need to be on a CIWA protocol.         Aranza Milton MD  06/13/24 0263

## 2024-06-12 NOTE — CONSULTS
[facility]: [facility]    Lissette Flores, MERLEA, CNRN, ACNP-BC  [facility] Neurology  [address]  [phone]        Name:   Troy Aguilar   Medical record #: [id number]  Admission Date: 6/11/2024       Consult requested by: Jaya Still MD     Reason for Consult:  Ataxia      HISTORY OF PRESENT ILLNESS:     This is a 49 y.o. male who is admitted for episodes of unsteady gait, dizziness, intermittent lethargy, anemia.    Troy Aguilar presented to the ED on 6/11/2024 after multiple episodes of gait instability which he describes as people observing him swaying while walking associated with dizziness, lethargy, nausea and vomiting and pulsatile tinnitus.  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  He typically drinks 316 ounce beers per evening and says that his last drink was immediately prior to coming to the ED yesterday.  He says he has stopped drinking previously without difficulty but has only been able to stay dry for 6 months at the time.  His wife reported to the ED provider that he fell 6 months ago and struck his head and was found passed out in the parking lot and lethargic afterwards but was not evaluated at that time.  Upon arrival to the ED the provider found leftward beating horizontal nystagmus and an otherwise nonfocal exam including a steady gait.  His presenting blood pressure was 142/78, afebrile, review of labs reveals an admission sodium of 133, presenting glucose of 132, elevated LFTs suggestive of transaminitis, TSH of 0.08, ethanol level of 64, white blood cell count of 4.3, hemoglobin of 6.9, negative tox screen.  The Neurology Service is asked to evaluate for posterior stroke.    Patient has not been seen by the [facility] Neurology team previously.      Neuro-imaging:     CT Head: No acute process    CTA Head and Neck: No evidence of LVO or carotid stenosis    EKG: normal sinus  2+/ 4  LLExt: 2+/4                  RLExt: 2+/ 4          Sensory:   LT and Temp intact in all extremities            Cerebellar:  Bilateral upper extremity intentional tremor. No pronator drift                            Motor:           LUExt: 5/ 5               RUExt: 5/ 5                                              LLExt: 5/ 5                RLExt: 5/ 5        Gait:   Not tested       Portions of this note were completed with Dragon, the computer voice recognition software.  Quite often unanticipated grammatical, syntax, homophones, and other interpretive errors are inadvertently transcribed by the computer software.  Please disregard these errors.  Efforts were made to edit the dictations but occasionally words are mis-transcribed.

## 2024-06-12 NOTE — CARE COORDINATION
Care Management Initial Assessment Note:        06/12/24 0745   Service Assessment   History Provided By Medical Record   Discharge Planning   Patient expects to be discharged to: House   Services At/After Discharge   Transition of Care Consult (CM Consult) N/A   Mode of Transport at Discharge Other (see comment)  (family)   Confirm Follow Up Transport Self     Patient with low readmission risk score of 11%. No identified CM needs at this time. Please consult CM for any discharge planning needs that may arise.   ______________________  Geneva STUART, RN  Care Management  6/12/2024  7:46 AM

## 2024-06-12 NOTE — PLAN OF CARE
Problem: Discharge Planning  Goal: Discharge to home or other facility with appropriate resources  Outcome: Progressing  Flowsheets (Taken 6/12/2024 0107)  Discharge to home or other facility with appropriate resources:   Identify barriers to discharge with patient and caregiver   Identify discharge learning needs (meds, wound care, etc)

## 2024-06-12 NOTE — DISCHARGE INSTRUCTIONS
HOSPITALIST DISCHARGE INSTRUCTIONS  NAME:  Troy Aguilar   :  1975   MRN:  [id number]     Date/Time:  2024 11:03 AM    ADMIT DATE: 2024     DISCHARGE DATE: 2024     DISCHARGE DIAGNOSIS:  Symptomatic Anemia, iron deficient     DISCHARGE INSTRUCTIONS:  Thank you for allowing us to participate in your care. Your discharging Hospitalist is Jaya Still MD. You were admitted for evaluation and treatment of following:    Symptomatic Anemia, iron deficient - this is likely chronic, due to chronic blood loss, due to alcohol abuse.  Take iron at home.  GI was consulted. 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  Take an antacid twice every day for a month, and start taking nadolol.      Alcohol abuse - We advise cessation.  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  Outpatient follow up in liver clinic     Diabetes mellitus type 2 without complications - eat a diabetic diet. Continue home metformin, stop ozempic.      Fatigue - This due to a combination of anemia, alcohol neuropathy, obesity, deconditioning.      Hypertension - Continue losartan      Hyperlipidemia - Continue atorvastatin      Hypothyroidism - Continue synthroid, but at a lower dose     Obese - We advise weight loss and outpatient testing for sleep apnea.      Insomnia / Anxiety and depression - Continue escitalopran and ambien.  You would benefit from outpatient counseling and treatment.      MEDICATIONS:    It is important that you take the medication exactly as they are prescribed.   Keep your medication in the bottles provided by the pharmacist and keep a list of the medication names, dosages, and times to be taken in your wallet.   Do not take other medications without consulting your doctor.       If you experience any of the following symptoms then please call your primary care physician or return to the emergency room if you cannot  get hold of your doctor:  Fever, chills, nausea, vomiting, diarrhea, change in mentation, falling, bleeding, shortness of breath    Follow Up:  Please call the below provider to arrange hospital follow up appointment      Sukhdev Suarez MD  [address] [facility] [address] [phone]    Schedule an appointment as soon as possible for a visit in 1 week(s)      Jose Angel Hermosillo MD  [address] [address] [phone]    Schedule an appointment as soon as possible for a visit in 1 week(s)        For questions regarding your Hospitalization or to contact the Hospital Medicine team, please call [phone].      Information obtained by :  I understand that if any problems occur once I am at home I am to contact my physician.    I understand and acknowledge receipt of the instructions indicated above.                                                                                                                                           Physician's or R.N.'s Signature                                                                  Date/Time                                                                                                                                              Patient or Representative Signature                                                          Date/Time

## 2024-06-12 NOTE — ED NOTES
Report given to [facility] staff and Xiomara RN    No complaints of pain prior to leaving. Pt is on room air. Mag to finish in route

## 2024-06-12 NOTE — ED NOTES
..ED TO INPATIENT SBAR HANDOFF    Patient Name: Troy Aguilar   :  1975  49 y.o.   MRN:  [id number]  ED Room #:  ER15/15  Family/Caregiver Present no       Situation  Code Status: Full Code     Allergies: Patient has no known allergies.  Weight: Patient Vitals for the past 96 hrs (Last 3 readings):   Weight   24 1815 109.7 kg (241 lb 13.5 oz)     Arrived from: home  Chief Complaint:   Chief Complaint   Patient presents with    Fatigue    Dizziness     Hospital Problem/Diagnosis:  Principal Problem:    Alcohol withdrawal syndrome, uncomplicated (HCC)  Active Problems:    Elevated LFTs    HTN (hypertension), benign    Hyperlipidemia    Hypothyroidism    DM (diabetes mellitus), type 2 (HCC)    Microcytic anemia    Ataxic gait    BMI 31.0-31.9,adult    Hyponatremia  Resolved Problems:    * No resolved hospital problems. *    Imaging:   CT HEAD WO CONTRAST   Final Result   No acute intracranial abnormality.            Electronically signed by Alexander David MD      CTA HEAD NECK W CONTRAST   Final Result   1. No hemodynamically significant extracranial ICA stenosis (using NASCET   criteria).   2. No large vessel occlusion or significant intracranial stenosis.            Electronically signed by Malick Dorsey      MRI BRAIN WO CONTRAST    (Results Pending)   US GALLBLADDER RUQ    (Results Pending)     Abnormal labs:   Abnormal Labs Reviewed   CBC WITH AUTO DIFFERENTIAL - Abnormal; Notable for the following components:       Result Value    RBC 3.66 (*)     Hemoglobin 6.9 (*)     Hematocrit 23.4 (*)     MCV 63.9 (*)     MCH 18.9 (*)     MCHC 29.5 (*)     RDW 18.5 (*)     All other components within normal limits   COMPREHENSIVE METABOLIC PANEL - Abnormal; Notable for the following components:    Sodium 133 (*)     Chloride 96 (*)     Glucose 132 (*)     BUN/Creatinine Ratio 8 (*)      (*)      (*)     Alk Phosphatase 167 (*)     Total Protein 8.4 (*)     Albumin 3.3 (*)     Globulin 5.1 (*)      Albumin/Globulin Ratio 0.6 (*)     All other components within normal limits   MAGNESIUM - Abnormal; Notable for the following components:    Magnesium 1.4 (*)     All other components within normal limits   TSH - Abnormal; Notable for the following components:    TSH, 3rd Generation 0.08 (*)     All other components within normal limits   ETHANOL - Abnormal; Notable for the following components:    Ethanol Lvl 64 (*)     All other components within normal limits   POCT GLUCOSE - Abnormal; Notable for the following components:    POC Glucose 144 (*)     All other components within normal limits     Abnormal Assessment Findings: tremors and fatigue     SAFETY    Mobility: no current mobility problem   ED Fall Risk: Presents to emergency department  because of falls (Syncope, seizure, or loss of consciousness): Yes, Age > 70: No, Altered Mental Status, Intoxication with alcohol or substance confusion (Disorientation, impaired judgment, poor safety awaremess, or inability to follow instructions): No, Impaired Mobility: Ambulates or transfers with assistive devices or assistance; Unable to ambulate or transer.: No, Nursing Judgement: Yes   Restraints no   Sitter no     Background  History:   Past Medical History:   Diagnosis Date    Diabetes mellitus (HCC)     Hyperlipidemia     Hypertension     Thyroid disease        Assessment    Vitals/MEWS:        Vitals:    06/11/24 2300 06/11/24 2323 06/11/24 2343 06/12/24 0030   BP: (!) 158/86 (!) 158/86 128/80 123/70   Pulse: (!) 113  (!) 103 (!) 107   Resp: 22  14 18   Temp: 98.7 °F (37.1 °C) 98.7 °F (37.1 °C) 98.8 °F (37.1 °C)    TempSrc: Oral Oral Oral    SpO2: 95%  96% 93%   Weight:       Height:         DI:   Predictive Model Details          26 (Normal)  Factor Value    Calculated 6/12/2024 00:53 32% Age 49 years old    Deterioration Index Model 16% Cardiac rhythm Sinus tachy     15% Pulse 107     12% Hematocrit abnormal (23.4 %)     9% Respiratory rate 18     8% Sodium

## 2024-06-12 NOTE — H&P
[facility]  [address] [place]  [address] [phone]    Hospital Medicine History and Physical      NAME:       Troy Aguilar   :       1975   MRN:      [id number]     Date of service:   2024     Chief  Complaint:  Episodes of unsteady gait, dizziness, intermittent lethargy, anemia     History Of Presenting Illness:       Mr. Aguilar is a 49 y.o. male who is being admitted for multiple medical issues including alcohol withdrawal syndrome, uncomplicated. Mr. Aguilar presented to our Emergency Department today complaining of the above symptoms which have been ongoing x 6 months. He says that people have told him that he has acted like he is drunk with episodes of lethargy, sleeping and episodes of unsteady gait. No overt seizure activity. He drinks alcohol. He was seen in the ED due to worsening symptoms and a CT head and CTA head and neck done were unremarkable for acute changes. He was found to have microcytic anemia with a Hgb of 6.9. He denies any overt bleeding. No melena stools. No hematuria. He says he had an EGD and colonoscopy that were overall unremarkable about 1 and half years ago. He was also noted to have likely withdrawal symptoms and he will be admitted for further management.      No Known Allergies    Prior to Admission medications    Medication Sig Start Date End Date Taking? Authorizing Provider   LEVOTHYROXINE SODIUM PO Take 0.275 mcg by mouth daily   Yes Francesco Ruelas MD   LOSARTAN POTASSIUM PO Take by mouth daily   Yes Francesco uRelas MD   escitalopram (LEXAPRO) 20 MG tablet Take 1 tablet by mouth daily   Yes Francesco Ruelas MD   ATORVASTATIN CALCIUM PO Take by mouth   Yes Francesco Ruelas MD   METFORMIN HCL PO Take by mouth in the morning and at bedtime   Yes Francesco Ruelas MD  Soft abdomen, non-tender, non-distended, normoactive bowel sounds with no palpable organomegaly  Musculoskeletal:  No cyanosis, clubbing, atrophy or deformities  Skin:  No rashes, bruising or ulcers   Neurological: Awake and alert, tremulous, speech is clear, CNs 2-12 are grossly intact. No dyskinesia. Otherwise non focal  Psychiatric:  Has a fair insight and is oriented x 3  ________________________________________________________________________    Data Review: I have reviewed reports and independently interpreted the following  diagnostic tests    Diagnostic testing:    Laboratory data reviewed and independently interpreted:    Recent Labs     06/11/24  1820   WBC 4.3   HGB 6.9*   HCT 23.4*   RBC 3.66*   MCV 63.9*   MCH 18.9*        No results found for: \"LACTA\"  Recent Labs     06/11/24  1819 06/11/24  1820   NA  --  133*   K  --  3.9   CL  --  96*   CO2  --  26   GLUCOSE  --  132*   BUN  --  8   CREATININE  --  1.00   CALCIUM  --  9.6   MG  --  1.4*   BILITOT  --  0.7   ALKPHOS  --  167*   AST  --  145*   ALT  --  102*   INR 1.0  --      No components found for: \"GLUCOSEPOC\"  No results found for: \"CHOL\", \"TRIG\", \"HDL\"    Imaging data reviewed:    CTA HEAD NECK W CONTRAST    Result Date: 6/11/2024  1. No hemodynamically significant extracranial ICA stenosis (using NASCET criteria). 2. No large vessel occlusion or significant intracranial stenosis. Electronically signed by Malick Dorsey    CT HEAD WO CONTRAST    Result Date: 6/11/2024  No acute intracranial abnormality. Electronically signed by Alexander David MD    I have also reviewed available old medical records.     Assessment & Impression:     Mr. Aguilar is a 49 y.o. male being evaluated for:     Principal Problem:    Alcohol withdrawal syndrome, uncomplicated (HCC)  Active Problems:    Elevated LFTs    HTN (hypertension), benign    Hyperlipidemia    Hypothyroidism    DM (diabetes mellitus), type 2 (HCC)    Microcytic anemia    Ataxic gait    BMI

## 2024-06-12 NOTE — CONSULTS
thiamine mononitrate tablet 100 mg  100 mg Oral Daily    multivitamin 1 tablet  1 tablet Oral Daily       Social History:  Social History     Tobacco Use    Smoking status: Never    Smokeless tobacco: Never   Substance Use Topics    Alcohol use: Yes     Comment: weekend beer 7       Family History:  History reviewed. No pertinent family history.    Review of Systems:  Constitutional: negative fever, negative chills, negative weight loss  Eyes:   negative visual changes  ENT:   negative sore throat, tongue or lip swelling  Respiratory:  negative cough, negative dyspnea  Cards:  negative for chest pain, palpitations, lower extremity edema  GI:   See HPI  :  negative for frequency, dysuria  Integument:  negative for rash and pruritus  Heme:  negative for easy bruising and gum/nose bleeding  Musculoskel: negative for myalgias,  back pain and muscle weakness  Neuro: negative for headaches, dizziness, vertigo  Psych:  negative for feelings of anxiety, depression     Objective:   Patient Vitals for the past 8 hrs:   BP Temp Temp src Pulse Resp SpO2   06/12/24 1121 119/76 98.1 °F (36.7 °C) Oral 84 16 99 %   06/12/24 1037 124/88 -- -- 98 -- --   06/12/24 1036 (!) 138/93 -- -- 95 -- --   06/12/24 1035 136/85 -- -- 90 -- --   06/12/24 0808 128/78 -- -- 89 -- --   06/12/24 0753 128/78 98.1 °F (36.7 °C) Oral 89 16 98 %     No intake/output data recorded.  06/10 1901 - 06/12 0700  In: 760 [P.O.:400]  Out: 150 [Urine:150]    EXAM:     NEURO-alert, oriented x3, affect appropriate and no focal neurological deficits. Negative for asterixis    HEENT-Head: Normocephalic, no lesions, without obvious abnormality.   LUNGS-clear to auscultation bilaterally    COR-regular rate and rhythym     ABD- soft, non-tender. Bowel sounds normal. No masses,  no organomegaly     EXT-no edema    Skin - No rash     Data Review     Recent Labs     06/11/24  1820 06/12/24  0433 06/12/24  0637   WBC 4.3 3.6*  --    HGB 6.9* 6.8* 7.0*   HCT 23.4* 22.9*  23.8*    145*  --      Recent Labs     06/11/24  1820 06/12/24  0433 06/12/24  1007   * 135*  --    K 3.9 4.1  --    CL 96* 101  --    CO2 26 29  --    BUN 8 11  --    PHOS  --   --  2.3*     Recent Labs     06/11/24  1820 06/12/24  0433   GLOB 5.1* 4.5*     Recent Labs     06/11/24  1819 06/12/24  0433   INR 1.0 1.2*       Patient Active Problem List   Diagnosis    Alcohol withdrawal syndrome, uncomplicated (HCC)    Elevated LFTs    Hypertension    Hyperlipidemia    Hypothyroidism    Diabetes mellitus (HCC)    Microcytic anemia    Ataxic gait    BMI 31.0-31.9,adult    Hyponatremia    Obese    Insomnia    Anxiety and depression    Alcohol abuse    Fatigue       Assessment and Plan:  Patient is a 49 y.o. with hx of T2DM, HLD, HTN, hypothyroid who presented to Ed after 6 mo history of confusion, falls and fatigue. Found to have significant CHRISTIANO and elevated liver enzymes     For CHRISTIANO, no overt s/s of bleeding. S/p 1u prbc. Last scopes 2.5 years ago with polyps and grade C esophagitis   -EGD/colonoscopy planned for tomorrow for further evaluation. Discussed procedures and patient is agreeable to proceed. CLD today, npo at midnight. Prep with golytely   -agree with IV iron then d/c with oral iron   -BID PPI     For elevated liver enzymes, likely alcoholic hepatitis. Viral hepatitis negative. Normal cbd on US. While US shows hepatic parenchymal disease, FIB-4 score is 4.29 indicating F3-F4 fibrosis (F4 being cirrhosis). Low albumin and mildly elevated INR also support possible cirrhosis. Platelets were normal upon admission   Discriminate function 1.2, good prognosis, no indication for steroids   -needs complete alcohol cessation, discussed in detail and he voiced understanding   -Will need outpatient f/u for full serologies with hx of elevated LFTS and mother with liver disease  -recommend he adhere to 2g sodium restricted diet      Thanks for allowing me to participate in the care of this patient.  Signed  By: BELA WEBSTER PA-C     6/12/2024  2:58 PM

## 2024-06-12 NOTE — PROGRESS NOTES
PT order received and chart reviewed. RN cleared patient for PT evaluation but states he is up independently. Met with patient and his visitor at bedside. Patient states he is independent without any PT needs, has been mobilizing frequently already today and wishes to complete PT order. He denies concerns with feeling \"unsteady\" per H&P. Will defer consult per patient request.     Jhoana Garza, PT, DPT

## 2024-06-12 NOTE — PROGRESS NOTES
[facility]    Hospitalist Progress Note    NAME: Troy Aguilar   :  1975  MRM:  [id number]    Date/Time of service 2024  10:48 AM    To assist coordination of care and communication with nursing and staff, this note may be preliminary early in the day, but finalized by end of the day.        Assessment and Plan:     Anemia, iron deficient - Likely chronic, likely due to chronic blood loss, likely related to alcohol.  Give IV iron. Rx iron PO at home.  GI consult to consider urgent upper endoscopy here, or as outpatient.  Start PPI.      Alcohol abuse / Elevated LFTs / Hyponatremia / thrombocytopenia - POA, advised cessation.  No sign of withdrawal.  Stop CIWA. Continue Goody vitamins. Presumed to have early cirrhosis.  Outpatient follow up in liver clinic    Diabetes mellitus type 2 without complications - Diabetic diet and counseling.  SSI per protocol.  Continue home metformin, stop ozempic. Check A1c.    Fatigue - POA, worsening over months.  Non focal.  Not TIA/CVA.  Stop stroke workup.  PT eval.  Fatigue is likely due to anemia, alcohol neuropathy, obesity, deconditioning, etc.     Hypertension - POA, continue losartan      Hyperlipidemia - POA, continue atorvastatin      Hypothyroidism - POA, TSH low, T4 near top normal, continue synthroid at a lower dose    Obese - Advise weight loss and outpatient testing for sleep apnea.      Insomnia / Anxiety and depression - POA, continue escitalopran and ambien.  Would benefit from outpatient counseling and treatment.       Subjective:     Chief Complaint:  feels fine, still fatigue    ROS:  (bold if positive, if negative)    Tolerating PT  Tolerating Diet        Objective:     Last 24hrs VS reviewed since prior progress note. Most recent are:    Vitals:    24 0808 24 1035 24 1036 24 1037   BP: 128/78 136/85 (!) 138/93 124/88   Pulse: 89 90 95 98   Resp:       Temp:       TempSrc:       SpO2:      17 g  17 g Oral Daily PRN    acetaminophen (TYLENOL) tablet 650 mg  650 mg Oral Q6H PRN    sodium chloride flush 0.9 % injection 5-40 mL  5-40 mL IntraVENous 2 times per day    sodium chloride flush 0.9 % injection 5-40 mL  5-40 mL IntraVENous PRN    0.9 % sodium chloride infusion   IntraVENous PRN    thiamine mononitrate tablet 100 mg  100 mg Oral Daily    multivitamin 1 tablet  1 tablet Oral Daily    LORazepam (ATIVAN) tablet 1 mg  1 mg Oral Q1H PRN    Or    LORazepam (ATIVAN) tablet 2 mg  2 mg Oral Q1H PRN    Or    LORazepam (ATIVAN) tablet 3 mg  3 mg Oral Q1H PRN    Or    LORazepam (ATIVAN) tablet 4 mg  4 mg Oral Q1H PRN    diazePAM (VALIUM) injection 5 mg  5 mg IntraVENous Q1H PRN    Or    diazePAM (VALIUM) injection 10 mg  10 mg IntraVENous Q1H PRN    Or    diazePAM (VALIUM) injection 15 mg  15 mg IntraVENous Q1H PRN    Or    diazePAM (VALIUM) injection 20 mg  20 mg IntraVENous Q1H PRN        Lab Data Reviewed: (see below)  Lab Review:     Recent Labs     06/11/24 1820 06/12/24 0433 06/12/24  0637   WBC 4.3 3.6*  --    HGB 6.9* 6.8* 7.0*   HCT 23.4* 22.9* 23.8*    145*  --      Recent Labs     06/11/24 1820 06/12/24  0433   * 135*   K 3.9 4.1   CL 96* 101   CO2 26 29   GLUCOSE 132* 107*   BUN 8 11   CREATININE 1.00 0.79   CALCIUM 9.6 9.3   MG 1.4*  --    * 114*   * 84*     Lab Results   Component Value Date/Time    GLUCOSE 107 06/12/2024 04:33 AM    GLUCOSE 132 06/11/2024 06:20 PM     No results for input(s): \"PH\", \"PCO2\", \"PO2\", \"HCO3\", \"FIO2\" in the last 72 hours.  Recent Labs     06/11/24  1819 06/12/24 0433   INR 1.0 1.2*     Results       Procedure Component Value Units Date/Time    COVID-19 & Influenza Combo [[id number]] Collected: 06/12/24 1040    Order Status: Sent Specimen: Nasopharyngeal Swab Updated: 06/12/24 1044    Culture, Urine [[id number]]     Order Status: Sent Specimen: Urine, clean catch             Other pertinent lab: none    Total time spent with patient: 30

## 2024-06-13 ENCOUNTER — ANESTHESIA EVENT (OUTPATIENT)
Facility: HOSPITAL | Age: 49
DRG: 897 | End: 2024-06-13
Payer: COMMERCIAL

## 2024-06-13 ENCOUNTER — ANESTHESIA (OUTPATIENT)
Facility: HOSPITAL | Age: 49
DRG: 897 | End: 2024-06-13
Payer: COMMERCIAL

## 2024-06-13 VITALS
HEART RATE: 88 BPM | OXYGEN SATURATION: 98 % | TEMPERATURE: 98.2 F | WEIGHT: 241.84 LBS | DIASTOLIC BLOOD PRESSURE: 72 MMHG | SYSTOLIC BLOOD PRESSURE: 109 MMHG | BODY MASS INDEX: 31.04 KG/M2 | HEIGHT: 74 IN | RESPIRATION RATE: 14 BRPM

## 2024-06-13 PROBLEM — R42 DIZZINESS: Status: ACTIVE | Noted: 2024-06-13

## 2024-06-13 LAB
ALBUMIN SERPL-MCNC: 3.1 G/DL (ref 3.5–5)
ALBUMIN/GLOB SERPL: 0.7 (ref 1.1–2.2)
ALP SERPL-CCNC: 145 U/L (ref 45–117)
ALT SERPL-CCNC: 85 U/L (ref 12–78)
ANION GAP SERPL CALC-SCNC: 5 MMOL/L (ref 5–15)
AST SERPL-CCNC: 134 U/L (ref 15–37)
BACTERIA SPEC CULT: NORMAL
BILIRUB SERPL-MCNC: 1.7 MG/DL (ref 0.2–1)
BUN SERPL-MCNC: 9 MG/DL (ref 6–20)
BUN/CREAT SERPL: 11 (ref 12–20)
CALCIUM SERPL-MCNC: 9.3 MG/DL (ref 8.5–10.1)
CHLORIDE SERPL-SCNC: 102 MMOL/L (ref 97–108)
CO2 SERPL-SCNC: 27 MMOL/L (ref 21–32)
CREAT SERPL-MCNC: 0.79 MG/DL (ref 0.7–1.3)
EKG ATRIAL RATE: 109 BPM
EKG DIAGNOSIS: NORMAL
EKG P AXIS: 50 DEGREES
EKG P-R INTERVAL: 174 MS
EKG Q-T INTERVAL: 356 MS
EKG QRS DURATION: 94 MS
EKG QTC CALCULATION (BAZETT): 479 MS
EKG R AXIS: 58 DEGREES
EKG T AXIS: 53 DEGREES
EKG VENTRICULAR RATE: 109 BPM
ERYTHROCYTE [DISTWIDTH] IN BLOOD BY AUTOMATED COUNT: 18.7 % (ref 11.5–14.5)
GLOBULIN SER CALC-MCNC: 4.6 G/DL (ref 2–4)
GLUCOSE SERPL-MCNC: 101 MG/DL (ref 65–100)
HCT VFR BLD AUTO: 23.8 % (ref 36.6–50.3)
HGB BLD-MCNC: 7.1 G/DL (ref 12.1–17)
MAGNESIUM SERPL-MCNC: 1.6 MG/DL (ref 1.6–2.4)
MCH RBC QN AUTO: 19.7 PG (ref 26–34)
MCHC RBC AUTO-ENTMCNC: 29.8 G/DL (ref 30–36.5)
MCV RBC AUTO: 66.1 FL (ref 80–99)
NRBC # BLD: 0 K/UL (ref 0–0.01)
NRBC BLD-RTO: 0 PER 100 WBC
PHOSPHATE SERPL-MCNC: 2.1 MG/DL (ref 2.6–4.7)
PLATELET # BLD AUTO: 153 K/UL (ref 150–400)
PMV BLD AUTO: 9.5 FL (ref 8.9–12.9)
POTASSIUM SERPL-SCNC: 3.7 MMOL/L (ref 3.5–5.1)
PROT SERPL-MCNC: 7.7 G/DL (ref 6.4–8.2)
RBC # BLD AUTO: 3.6 M/UL (ref 4.1–5.7)
SERVICE CMNT-IMP: NORMAL
SODIUM SERPL-SCNC: 134 MMOL/L (ref 136–145)
WBC # BLD AUTO: 3.5 K/UL (ref 4.1–11.1)

## 2024-06-13 PROCEDURE — 36415 COLL VENOUS BLD VENIPUNCTURE: CPT

## 2024-06-13 PROCEDURE — 80053 COMPREHEN METABOLIC PANEL: CPT

## 2024-06-13 PROCEDURE — 7100000010 HC PHASE II RECOVERY - FIRST 15 MIN: Performed by: INTERNAL MEDICINE

## 2024-06-13 PROCEDURE — 84100 ASSAY OF PHOSPHORUS: CPT

## 2024-06-13 PROCEDURE — 0DBL8ZZ EXCISION OF TRANSVERSE COLON, VIA NATURAL OR ARTIFICIAL OPENING ENDOSCOPIC: ICD-10-PCS | Performed by: INTERNAL MEDICINE

## 2024-06-13 PROCEDURE — 6370000000 HC RX 637 (ALT 250 FOR IP): Performed by: INTERNAL MEDICINE

## 2024-06-13 PROCEDURE — 96376 TX/PRO/DX INJ SAME DRUG ADON: CPT

## 2024-06-13 PROCEDURE — 0DBK8ZZ EXCISION OF ASCENDING COLON, VIA NATURAL OR ARTIFICIAL OPENING ENDOSCOPIC: ICD-10-PCS | Performed by: INTERNAL MEDICINE

## 2024-06-13 PROCEDURE — 88305 TISSUE EXAM BY PATHOLOGIST: CPT

## 2024-06-13 PROCEDURE — 3600007512: Performed by: INTERNAL MEDICINE

## 2024-06-13 PROCEDURE — 3600007502: Performed by: INTERNAL MEDICINE

## 2024-06-13 PROCEDURE — 2709999900 HC NON-CHARGEABLE SUPPLY: Performed by: INTERNAL MEDICINE

## 2024-06-13 PROCEDURE — 3700000000 HC ANESTHESIA ATTENDED CARE: Performed by: INTERNAL MEDICINE

## 2024-06-13 PROCEDURE — 93010 ELECTROCARDIOGRAM REPORT: CPT | Performed by: INTERNAL MEDICINE

## 2024-06-13 PROCEDURE — 7100000011 HC PHASE II RECOVERY - ADDTL 15 MIN: Performed by: INTERNAL MEDICINE

## 2024-06-13 PROCEDURE — 6360000002 HC RX W HCPCS: Performed by: NURSE ANESTHETIST, CERTIFIED REGISTERED

## 2024-06-13 PROCEDURE — G0378 HOSPITAL OBSERVATION PER HR: HCPCS

## 2024-06-13 PROCEDURE — 3700000001 HC ADD 15 MINUTES (ANESTHESIA): Performed by: INTERNAL MEDICINE

## 2024-06-13 PROCEDURE — 95816 EEG AWAKE AND DROWSY: CPT | Performed by: PSYCHIATRY & NEUROLOGY

## 2024-06-13 PROCEDURE — 6360000002 HC RX W HCPCS: Performed by: INTERNAL MEDICINE

## 2024-06-13 PROCEDURE — 83735 ASSAY OF MAGNESIUM: CPT

## 2024-06-13 PROCEDURE — 0DJ08ZZ INSPECTION OF UPPER INTESTINAL TRACT, VIA NATURAL OR ARTIFICIAL OPENING ENDOSCOPIC: ICD-10-PCS | Performed by: INTERNAL MEDICINE

## 2024-06-13 PROCEDURE — 85027 COMPLETE CBC AUTOMATED: CPT

## 2024-06-13 PROCEDURE — 94761 N-INVAS EAR/PLS OXIMETRY MLT: CPT

## 2024-06-13 PROCEDURE — 2580000003 HC RX 258: Performed by: INTERNAL MEDICINE

## 2024-06-13 RX ORDER — GLYCOPYRROLATE 0.2 MG/ML
INJECTION INTRAMUSCULAR; INTRAVENOUS PRN
Status: DISCONTINUED | OUTPATIENT
Start: 2024-06-13 | End: 2024-06-13 | Stop reason: SDUPTHER

## 2024-06-13 RX ORDER — NADOLOL 40 MG/1
40 TABLET ORAL DAILY
Status: DISCONTINUED | OUTPATIENT
Start: 2024-06-13 | End: 2024-06-13 | Stop reason: HOSPADM

## 2024-06-13 RX ORDER — PROPOFOL 10 MG/ML
INJECTION, EMULSION INTRAVENOUS CONTINUOUS PRN
Status: DISCONTINUED | OUTPATIENT
Start: 2024-06-13 | End: 2024-06-13 | Stop reason: SDUPTHER

## 2024-06-13 RX ORDER — NADOLOL 40 MG/1
40 TABLET ORAL DAILY
Qty: 30 TABLET | Refills: 3 | Status: SHIPPED | OUTPATIENT
Start: 2024-06-13

## 2024-06-13 RX ORDER — SODIUM CHLORIDE 9 MG/ML
INJECTION, SOLUTION INTRAVENOUS CONTINUOUS
Status: DISCONTINUED | OUTPATIENT
Start: 2024-06-13 | End: 2024-06-13 | Stop reason: HOSPADM

## 2024-06-13 RX ORDER — LIDOCAINE HYDROCHLORIDE 20 MG/ML
INJECTION, SOLUTION INTRAVENOUS PRN
Status: DISCONTINUED | OUTPATIENT
Start: 2024-06-13 | End: 2024-06-13 | Stop reason: SDUPTHER

## 2024-06-13 RX ADMIN — LEVOTHYROXINE SODIUM 100 MCG: 0.1 TABLET ORAL at 05:26

## 2024-06-13 RX ADMIN — SODIUM CHLORIDE: 9 INJECTION, SOLUTION INTRAVENOUS at 02:45

## 2024-06-13 RX ADMIN — PROPOFOL 50 MG: 10 INJECTION, EMULSION INTRAVENOUS at 14:25

## 2024-06-13 RX ADMIN — PANTOPRAZOLE SODIUM 40 MG: 40 TABLET, DELAYED RELEASE ORAL at 17:06

## 2024-06-13 RX ADMIN — PROPOFOL 50 MG: 10 INJECTION, EMULSION INTRAVENOUS at 14:28

## 2024-06-13 RX ADMIN — PROPOFOL 150 MCG/KG/MIN: 10 INJECTION, EMULSION INTRAVENOUS at 14:15

## 2024-06-13 RX ADMIN — GLYCOPYRROLATE 0.2 MG: 0.2 INJECTION INTRAMUSCULAR; INTRAVENOUS at 14:12

## 2024-06-13 RX ADMIN — PROPOFOL 100 MG: 10 INJECTION, EMULSION INTRAVENOUS at 14:20

## 2024-06-13 RX ADMIN — PANTOPRAZOLE SODIUM 40 MG: 40 TABLET, DELAYED RELEASE ORAL at 05:26

## 2024-06-13 RX ADMIN — NADOLOL 40 MG: 40 TABLET ORAL at 17:06

## 2024-06-13 RX ADMIN — IRON SUCROSE 100 MG: 20 INJECTION, SOLUTION INTRAVENOUS at 11:40

## 2024-06-13 RX ADMIN — LIDOCAINE HYDROCHLORIDE 50 MG: 20 INJECTION, SOLUTION INTRAVENOUS at 14:12

## 2024-06-13 ASSESSMENT — PAIN SCALES - GENERAL: PAINLEVEL_OUTOF10: 0

## 2024-06-13 ASSESSMENT — PAIN - FUNCTIONAL ASSESSMENT: PAIN_FUNCTIONAL_ASSESSMENT: 0-10

## 2024-06-13 NOTE — PLAN OF CARE
Problem: Discharge Planning  Goal: Discharge to home or other facility with appropriate resources  Outcome: Progressing  Flowsheets (Taken 6/12/2024 1955)  Discharge to home or other facility with appropriate resources:   Identify barriers to discharge with patient and caregiver   Identify discharge learning needs (meds, wound care, etc)     Problem: Chronic Conditions and Co-morbidities  Goal: Patient's chronic conditions and co-morbidity symptoms are monitored and maintained or improved  Outcome: Progressing  Flowsheets (Taken 6/12/2024 1955)  Care Plan - Patient's Chronic Conditions and Co-Morbidity Symptoms are Monitored and Maintained or Improved: Monitor and assess patient's chronic conditions and comorbid symptoms for stability, deterioration, or improvement

## 2024-06-13 NOTE — CARE COORDINATION
Care Management Discharge Note:      06/13/24 1235   Discharge Planning   Patient expects to be discharged to: House   Services At/After Discharge   Transition of Care Consult (CM Consult) N/A   Services At/After Discharge None   Mode of Transport at Discharge Other (see comment)  (family)   Confirm Follow Up Transport Self     Patient with dc orders. No identified CM needs. Patient will dc home with family to transport.   ______________________  Geneva STUART, RN  Care Management  6/13/2024  12:36 PM

## 2024-06-13 NOTE — PROGRESS NOTES
[facility]: [facility]    Lissette Flores, MSHA, CNRN, ACNP-BC  [facility]rology  [address]  [phone]        Name:   Troy Aguilar   Medical record #: [id number]  Admission Date: 6/11/2024   Reason for Consult:  Ataxia    Subjective/Objective:   Overnight events:    No acute events overnight, EGD/colonoscopy today with GI.        Discussed with patient and his wife at bedside that longstanding alcoholism can cause permanent damage to the cerebellum and can make people appear off balance.  While this may improve with cessation of alcohol consumption it may not resolve completely.            Plan of care discussed with:  Patient and Spouse at bedside primary team    Impression/ Plan:      1.  Ataxia:    B12 and folate normal  MRI brain canceled by primary team  Routine EEG without evidence of ictus    Thank you for allowing the Neurology Service the pleasure of participating in the care of your patient.  No further inpatient neurologic workup at this time, please call back with questions.     Physical Exam    Patient Vitals for the past 12 hrs:   Temp Pulse Resp BP SpO2   06/13/24 0819 98.4 °F (36.9 °C) 77 18 126/88 97 %   06/13/24 0252 98.4 °F (36.9 °C) 83 18 126/80 97 %   06/12/24 2308 97.9 °F (36.6 °C) 83 18 114/76 96 %   06/12/24 2245 -- 93 -- -- --          NEUROLOGICAL EXAM:       General:  Alert, cooperative, no acute distress  Mental Status: Oriented to time, place and person. Fully attentive. No aphasia. Full fund of knowledge. Normal recent and remote memory.      Cranial Nerves:   Visual Fields:  normal in all quadrants in both eyes. EOM: no nystagmus. Facial movements:  symmetric, no ptosis Facial sensation:  intact to LT on both sides. Hearing:  normal.       Language:  no dysarthria, no aphasia, normal fluency, normal repetition. Tongue: midline. Soft palate: not examined  SCMs: normal, symmetric.          Reflexes:   LUExt: 2+/ 4                 RUExt:

## 2024-06-13 NOTE — PROGRESS NOTES
TRANSFER - IN REPORT:    Verbal report received from 303 on Troy Aguilar  being received from ELYSE Parker-Mora for ordered procedure      Report consisted of patient's Situation, Background, Assessment and   Recommendations(SBAR).     Information from the following report(s) Nurse Handoff Report was reviewed with the receiving nurse.    Opportunity for questions and clarification was provided.      Assessment completed upon patient's arrival to unit and care assumed.

## 2024-06-13 NOTE — PLAN OF CARE
Problem: Discharge Planning  Goal: Discharge to home or other facility with appropriate resources  Outcome: Adequate for Discharge     Problem: Chronic Conditions and Co-morbidities  Goal: Patient's chronic conditions and co-morbidity symptoms are monitored and maintained or improved  Outcome: Adequate for Discharge     Problem: Pain  Goal: Verbalizes/displays adequate comfort level or baseline comfort level  Outcome: Adequate for Discharge     Problem: Safety - Adult  Goal: Free from fall injury  Outcome: Adequate for Discharge

## 2024-06-13 NOTE — ANESTHESIA PRE PROCEDURE
Department of Anesthesiology  Preprocedure Note       Name:  Troy Aguilar   Age:  49 y.o.  :  1975                                          MRN:  [id number]         Date:  2024      Surgeon: Surgeon(s):  Bunny Lakhani MD    Procedure: Procedure(s):  ESOPHAGOGASTRODUODENOSCOPY  COLONOSCOPY DIAGNOSTIC    Medications prior to admission:   Prior to Admission medications    Medication Sig Start Date End Date Taking? Authorizing Provider   ferrous gluconate (FERGON) 324 (38 Fe) MG tablet Take 1 tablet by mouth daily (with breakfast) 24  Yes Jaya Still MD   levothyroxine (SYNTHROID) 150 MCG tablet Take 1 tablet by mouth Daily 24 Yes Jaya Still MD   pantoprazole (PROTONIX) 40 MG tablet Take 1 tablet by mouth 2 times daily (before meals) 24  Yes Jaya Still MD   Multiple Vitamin (MULTIVITAMIN) TABS tablet Take 1 tablet by mouth daily 24 Yes Jaya Still MD   thiamine mononitrate (THIAMINE) 100 MG tablet Take 1 tablet by mouth daily 24 Yes Jaya Still MD   LOSARTAN POTASSIUM PO Take by mouth daily   Yes Francesco Ruelas MD   escitalopram (LEXAPRO) 20 MG tablet Take 1 tablet by mouth daily   Yes Francesco Ruelas MD   ATORVASTATIN CALCIUM PO Take by mouth   Yes Francesco Ruelas MD   METFORMIN HCL PO Take by mouth in the morning and at bedtime   Yes Francesco Ruelas MD   zolpidem (AMBIEN) 10 MG tablet Take 1 tablet by mouth nightly as needed. Max Daily Amount: 10 mg 24  Yes Francesco Ruelas MD   sildenafil (REVATIO) 20 MG tablet Take 1 tablet by mouth 3 times daily as needed (as needed) 24  Yes Francesco Ruelas MD       Current medications:    Current Facility-Administered Medications   Medication Dose Route Frequency Provider Last Rate Last Admin    0.9 % sodium chloride infusion   IntraVENous Continuous Bunny Lakhani MD 75 mL/hr at 24 0652 Rate Verify at 24 0652    zolpidem (AMBIEN)

## 2024-06-13 NOTE — DISCHARGE SUMMARY
Physician Discharge Summary     Patient ID:  Troy Aguilar  [id number]  49 y.o.  1975    Admit date: 6/11/2024    Discharge date of service and time: 6/13/2024  Greater than 30 minutes were spent providing discharge related services for this patient    Admission Diagnoses: Hypomagnesemia [E83.42]  Dizziness [R42]  Elevated LFTs [R79.89]  Low TSH level [R79.89]  Alcohol withdrawal syndrome, uncomplicated (HCC) [F10.930]  Alcohol withdrawal syndrome with complication (HCC) [F10.939]  Anemia, unspecified type [D64.9]    Discharge Diagnoses:    Principal Diagnosis   Fatigue                                             Hospital Course and other diagnoses  Anemia, iron deficient - Likely chronic, likely due to chronic blood loss, likely related to alcohol.  Give IV iron. Rx iron PO at home.  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  Start daily PPI.      Alcohol abuse / Elevated LFTs / Hyponatremia / thrombocytopenia - POA, advised cessation.  No sign of withdrawal.  Stop CIWA. Continue Goody vitamins. Presumed to have early cirrhosis.  Outpatient follow up in liver clinic     Diabetes mellitus type 2 without complications - Diabetic diet and counseling.  SSI per protocol.  Continue home metformin, stop ozempic. Check A1c.     Fatigue - POA, worsening over months.  Non focal.  Not TIA/CVA.  Stop stroke workup.  PT eval.  Fatigue is likely due to anemia, alcohol neuropathy, obesity, deconditioning, etc.      Hypertension - POA, continue losartan      Hyperlipidemia - POA, continue atorvastatin      Hypothyroidism - POA, TSH low, T4 near top normal, continue synthroid at a lower dose     Obese - Advise weight loss and outpatient testing for sleep apnea.      Insomnia / Anxiety and depression - POA, continue escitalopran and ambien.  Would benefit from outpatient counseling and

## 2024-06-13 NOTE — ANESTHESIA POSTPROCEDURE EVALUATION
Department of Anesthesiology  Postprocedure Note    Patient: Troy Aguilar  MRN: [id number]  YOB: 1975  Date of evaluation: 6/13/2024    Procedure Summary       Date: 06/13/24 Room / Location: [facility] 03 / [facility] ENDOSCOPY    Anesthesia Start: 1408 Anesthesia Stop: 1447    Procedures:       ESOPHAGOGASTRODUODENOSCOPY (Upper GI Region)      COLONOSCOPY DIAGNOSTIC (Lower GI Region)      COLONOSCOPY POLYPECTOMY SNARE/BIOPSY (Lower GI Region) Diagnosis:       Iron deficiency anemia, unspecified iron deficiency anemia type      (Iron deficiency anemia, unspecified iron deficiency anemia type [D50.9])    Surgeons: Bunny Lakhani MD Responsible Provider: Avinash Flores MD    Anesthesia Type: MAC ASA Status: 3            Anesthesia Type: MAC    Gemma Phase I: Gemma Score: 10    Gemma Phase II:      Anesthesia Post Evaluation    Patient location during evaluation: PACU  Patient participation: complete - patient participated  Level of consciousness: awake  Pain score: 0  Airway patency: patent  Nausea & Vomiting: no nausea and no vomiting  Cardiovascular status: blood pressure returned to baseline  Respiratory status: acceptable  Hydration status: euvolemic  Pain management: adequate    No notable events documented.

## 2024-06-13 NOTE — OP NOTE
[facility]  Bunny Lakhani M.D.  [phone]            2024          Colonoscopy Operative Report  Troy Aguilar  :  1975  [place] Medical Record Number:  [id number]      Indications:    Iron deficiency anemia, Personal history of colon polyps (screening only)     :  Bunny Lakhani MD    Referring Provider: Sukhdev Suarez MD    Sedation:  MAC anesthesia    Pre-Procedural Exam:      Airway: clear,  No airway problems anticipated  Heart: RRR, without gallops or rubs  Lungs: clear bilaterally without wheezes, crackles, or rhonchi  Abdomen: soft, nontender, nondistended, bowel sounds present  Mental Status: awake, alert and oriented to person, place and time     Procedure Details:  After informed consent was obtained with all risks and benefits of procedure explained and preoperative exam completed, the patient was taken to the endoscopy suite and placed in the left lateral decubitus position.  Upon sequential sedation as per above, a digital rectal exam was performed. The Olympus videocolonoscope  was inserted in the rectum and carefully advanced to the cecum, which was identified by the ileocecal valve and appendiceal orifice.  The quality of preparation was good.  The colonoscope was slowly withdrawn with careful inspection and evaluation between folds. Retroflexion in the rectum was performed.    Findings:   Terminal Ileum: normal  Cecum: normal  Ascending Colon: 2  Sessile polyp(s), the largest 3 mm in size;  Transverse Colon: 1  Sessile polyp(s), the largest 4 mm in size;  Descending Colon: normal  Sigmoid: no mucosal lesion appreciated  mild diverticulosis;  Rectum: no mucosal lesion appreciated  Grade 1 internal hemorrhoid(s);    Interventions:  3 complete polypectomy were performed using cold snare  and the polyps were  retrieved    Specimen Removed:  specimen #1, 3 mm in size, located in the ascending colon removed by cold snare and retrieved for

## 2024-06-13 NOTE — OP NOTE
[facility]  Bunny Lakhani M.D.  [phone]           2024                EGD Operative Report  Troy Aguilar  :  1975  [facility] Medical Record Number:  [id number]      Indication:  Chronic blood loss anemia    : Bunny Lakhani MD    Referring Provider:  Sukhdev Suarez MD      Anesthesia/Sedation:  MAC anesthesia    Airway assessment: No airway problems anticipated    Pre-Procedural Exam:      Airway: clear, no airway problems anticipated  Heart: RRR, without gallops or rubs  Lungs: clear bilaterally without wheezes, crackles, or rhonchi  Abdomen: soft, nontender, nondistended, bowel sounds present  Mental Status: awake, alert and oriented to person, place and time       Procedure Details     After infomed consent was obtained for the procedure, with all risks and benefits of procedure explained the patient was taken to the endoscopy suite and placed in the left lateral decubitus position.  Following sequential administration of sedation as per above, the endoscope was inserted into the mouth and advanced under direct vision to second portion of the duodenum.  A careful inspection was made as the gastroscope was withdrawn, including a retroflexed view of the proximal stomach; findings and interventions are described below.      Findings:   Esophagus:Evidence of two columns of large varices seen in the mid to distal esophagus. No red jennifer signs or cherry red spots seen, however minimal erythematous spots were noted in the distal esophagus and one thin superficial linear erosion noted as well in the distal esophagus, most consistent with mild acid reflux esophagitis. No stigmata of recent bleeding seen.  Stomach: normal mucosa, no gastric varices or portal hypertensive gastropathy seen  Duodenum/jejunum: normal    Therapies:  none    Specimens: none           Complications:   None; patient tolerated the procedure well.    EBL:  None.           Impression:    Mild

## 2024-06-13 NOTE — PROCEDURES
[place]        [phone] (Main)  [phone] (Medical Records)     Routine EEG (16-channel)    Date of Study:   6-12-24  Date of Interpretation: 6/13/24    Indication:    49 y.o. male  Hypomagnesemia [E83.42]  Dizziness [R42]  Elevated LFTs [R79.89]  Low TSH level [R79.89]  Alcohol withdrawal syndrome, uncomplicated (HCC) [F10.930]  Alcohol withdrawal syndrome with complication (HCC) [F10.939]  Anemia, unspecified type [D64.9]    PSHx lists Cranial Surgery: No    Impression: Normal awake EEG recording.  No epileptiform discharges were seen during this recording.  Clinical and Neuro-Imaging correlation is necessary    =================================  Technical: 16-channel, multiple montages, digital EEG, 10-20 international placement system format.   Simultaneous video recording is performed.  The technical quality of the study was adequate.  Single lead EKG was recorded.     Interpretation: The patient is described as awake and alert at the beginning of the recording.  The background is symmetric and low in amplitude.  The posterior dominant rhythm is 8 Hz on both sides.  Photic stimulation does not result in a clear driving response either side.  Hyperventilation and post-HV were normal.  Drowsiness and sleep were not recorded.  There are no focal areas of slowing, epileptiform discharge or subclinical seizures during this recording.  The single-lead EKG showed sinus rhythm.    =================================    Home Meds    Medications Prior to Admission: LOSARTAN POTASSIUM PO, Take by mouth daily  escitalopram (LEXAPRO) 20 MG tablet, Take 1 tablet by mouth daily  ATORVASTATIN CALCIUM PO, Take by mouth  METFORMIN HCL PO, Take by mouth in the morning and at bedtime  zolpidem (AMBIEN) 10 MG tablet, Take 1 tablet by mouth nightly as needed. Max Daily Amount: 10 mg  sildenafil (REVATIO) 20 MG tablet, Take 1 tablet by mouth 3 times daily as needed (as

## 2024-06-13 NOTE — PERIOP NOTE

## 2024-06-13 NOTE — PROGRESS NOTES
[facility]    Hospitalist Progress Note    NAME: Troy Aguilar   :  1975  MRM:  [id number]    Date/Time of service 2024  11:41 AM    To assist coordination of care and communication with nursing and staff, this note may be preliminary early in the day, but finalized by end of the day.        Assessment and Plan:     Anemia, iron deficient - Likely chronic, likely due to chronic blood loss, likely related to alcohol.  Give IV iron. Rx iron PO at home.  GI consult doing endoscopy today.  Start daily PPI.      Alcohol abuse / Elevated LFTs / Hyponatremia / thrombocytopenia - POA, advised cessation.  No sign of withdrawal.  Stop CIWA. Continue Goody vitamins. Presumed to have early cirrhosis.  Outpatient follow up in liver clinic    Diabetes mellitus type 2 without complications - Diabetic diet and counseling.  SSI per protocol.  Continue home metformin, stop ozempic. Check A1c.    Fatigue - POA, worsening over months.  Non focal.  Not TIA/CVA.  Stop stroke workup.  PT eval.  Fatigue is likely due to anemia, alcohol neuropathy, obesity, deconditioning, etc.     Hypertension - POA, continue losartan      Hyperlipidemia - POA, continue atorvastatin      Hypothyroidism - POA, TSH low, T4 near top normal, continue synthroid at a lower dose    Obese - Advise weight loss and outpatient testing for sleep apnea.      Insomnia / Anxiety and depression - POA, continue escitalopran and ambien.  Would benefit from outpatient counseling and treatment.       Subjective:     Chief Complaint:  feels fine, awaiting endo    ROS:  (bold if positive, if negative)    Tolerating PT   NPO        Objective:     Last 24hrs VS reviewed since prior progress note. Most recent are:    Vitals:    24 2308 24 0252 24 0700 24 0819   BP: 114/76 126/80  126/88   Pulse: 83 83 81 77   Resp: 18 18  18   Temp: 97.9 °F (36.6 °C) 98.4 °F (36.9 °C)  98.4 °F (36.9 °C)   TempSrc: Oral Oral  Oral    SpO2: 96% 97%  97%   Weight:       Height:          @[id number]@       Intake/Output Summary (Last 24 hours) at 6/13/2024 1141  Last data filed at 6/13/2024 0652  Gross per 24 hour   Intake 306.48 ml   Output --   Net 306.48 ml          Physical Exam:    Gen:  Obese, in no acute distress  HEENT:  Pink conjunctivae, PERRL, hearing intact to voice, moist mucous membranes  Neck:  Supple, without masses, thyroid non-tender  Resp:  No accessory muscle use, clear breath sounds without wheezes rales or rhonchi  Card:  No murmurs, normal S1, S2 without thrills, bruits or peripheral edema  Abd:  Soft, non-tender, non-distended, normoactive bowel sounds are present, no mass  Lymph:  No cervical or inguinal adenopathy  Musc:  No cyanosis or clubbing  Skin:  No rashes or ulcers, skin turgor is good  Neuro:  Cranial nerves are grossly intact, general motor weakness, follows commands   Psych:  Poor insight, oriented to person, place and time, alert    Telemetry reviewed:   normal sinus rhythm  __________________________________________________________________  Medications Reviewed: (see below)  Medications:     Current Facility-Administered Medications   Medication Dose Route Frequency    0.9 % sodium chloride infusion   IntraVENous Continuous    zolpidem (AMBIEN) tablet 10 mg  10 mg Oral Nightly PRN    pantoprazole (PROTONIX) tablet 40 mg  40 mg Oral BID AC    iron sucrose (VENOFER) injection 100 mg  100 mg IntraVENous Q24H    levothyroxine (SYNTHROID) tablet 100 mcg  100 mcg Oral QAM AC    atorvastatin (LIPITOR) tablet 10 mg  10 mg Oral Nightly    escitalopram (LEXAPRO) tablet 20 mg  20 mg Oral Daily    losartan (COZAAR) tablet 25 mg  25 mg Oral Daily    [START ON 6/16/2024] ferrous gluconate (FERGON) tablet 324 mg  324 mg Oral Daily with breakfast    butalbital-acetaminophen-caffeine (FIORICET, ESGIC) per tablet 1 tablet  1 tablet Oral Q4H PRN    magnesium sulfate 2000 mg in 50 mL IVPB premix  2,000 mg IntraVENous PRN  decisions.Results must be combined with clinical observations, patient history, and epidemiological information.        Rapid Influenza A By PCR Not detected        Rapid Influenza B By PCR Not detected        Comment: Testing was performed using praveen Paula SARS-CoV-2 and Influenza A/B nucleic acid assay.  This test is a multiplex Real-Time Reverse Transcriptase Polymerase Chain Reaction (RT-PCR) based in vitro diagnostic test intended for the qualitative detection of nucleic acids from SARS-CoV-2, Influenza A, and Influenza B in nasopharyngeal and nasal swab specimens for use under the FDA's Emergency Use Authorization (EUA) only.     Fact sheet for Patients: https://www.fda.gov/media/942825/download  Fact sheet for Healthcare Providers: https://www.fda.fov/media/126284/download                 Other pertinent lab: none    Total time spent with patient: 30 Minutes I personally reviewed chart, notes, data and current medications in the medical record.  I have personally examined and treated the patient at bedside during this period.                  Care Plan discussed with: Patient, Care Manager, Nursing Staff, Consultant/Specialist, and >50% of time spent in counseling and coordination of care    Discussed:  Care Plan and D/C Planning    Prophylaxis:  Lovenox and H2B/PPI    Disposition:  Home w/Family           ___________________________________________________    Attending Physician: Jaya Still MD

## 2024-06-13 NOTE — PROGRESS NOTES
TRANSFER - OUT REPORT:    Verbal report given to Saray RN on Troy Aguilar  being transferred to [facility] for routine progression of patient care       Report consisted of patient's Situation, Background, Assessment and   Recommendations(SBAR).     Information from the following report(s) Nurse Handoff Report and Recent Results was reviewed with the receiving nurse.           Lines:   Peripheral IV 06/11/24 Right Antecubital (Active)   Site Assessment Clean, dry & intact 06/13/24 1342   Line Status Infusing 06/13/24 1342   Line Care Connections checked and tightened 06/13/24 1342   Phlebitis Assessment No symptoms 06/13/24 1342   Infiltration Assessment 0 06/13/24 1342   Alcohol Cap Used Yes 06/13/24 1342   Dressing Status Clean, dry & intact 06/13/24 1342   Dressing Type Transparent 06/13/24 1342        Opportunity for questions and clarification was provided.    Patient transported with: chart

## 2024-06-14 LAB — EOSINOPHIL #/AREA URNS HPF: NEGATIVE

## 2024-08-21 ENCOUNTER — HOSPITAL ENCOUNTER (EMERGENCY)
Facility: HOSPITAL | Age: 49
Discharge: HOME | End: 2024-08-21
Attending: EMERGENCY MEDICINE
Payer: COMMERCIAL

## 2024-08-21 VITALS
BODY MASS INDEX: 28.6 KG/M2 | OXYGEN SATURATION: 99 % | RESPIRATION RATE: 18 BRPM | TEMPERATURE: 97.3 F | HEART RATE: 92 BPM | WEIGHT: 230 LBS | DIASTOLIC BLOOD PRESSURE: 90 MMHG | HEIGHT: 75 IN | SYSTOLIC BLOOD PRESSURE: 125 MMHG

## 2024-08-21 DIAGNOSIS — D64.9 ANEMIA, UNSPECIFIED TYPE: ICD-10-CM

## 2024-08-21 DIAGNOSIS — R53.83 OTHER FATIGUE: Primary | ICD-10-CM

## 2024-08-21 LAB
ABO + RH BLD: NORMAL
ALBUMIN SERPL-MCNC: 3.3 G/DL (ref 3.5–5)
ALBUMIN/GLOB SERPL: 0.7 (ref 1.1–2.2)
ALP SERPL-CCNC: 124 U/L (ref 45–117)
ALT SERPL-CCNC: 35 U/L (ref 12–78)
ANION GAP SERPL CALC-SCNC: 6 MMOL/L (ref 5–15)
AST SERPL-CCNC: 43 U/L (ref 15–37)
BASOPHILS # BLD: 0 K/UL (ref 0–0.1)
BASOPHILS NFR BLD: 1 % (ref 0–1)
BILIRUB SERPL-MCNC: 0.5 MG/DL (ref 0.2–1)
BLOOD GROUP ANTIBODIES SERPL: NORMAL
BUN SERPL-MCNC: 7 MG/DL (ref 6–20)
BUN/CREAT SERPL: 10 (ref 12–20)
CALCIUM SERPL-MCNC: 8.7 MG/DL (ref 8.5–10.1)
CHLORIDE SERPL-SCNC: 108 MMOL/L (ref 97–108)
CO2 SERPL-SCNC: 27 MMOL/L (ref 21–32)
COMMENT:: NORMAL
CREAT SERPL-MCNC: 0.73 MG/DL (ref 0.7–1.3)
DIFFERENTIAL METHOD BLD: ABNORMAL
EOSINOPHIL # BLD: 0.2 K/UL (ref 0–0.4)
EOSINOPHIL NFR BLD: 5 % (ref 0–7)
ERYTHROCYTE [DISTWIDTH] IN BLOOD BY AUTOMATED COUNT: 21.5 % (ref 11.5–14.5)
GLOBULIN SER CALC-MCNC: 4.5 G/DL (ref 2–4)
GLUCOSE SERPL-MCNC: 94 MG/DL (ref 65–100)
HCT VFR BLD AUTO: 28.2 % (ref 36.6–50.3)
HGB BLD-MCNC: 8.4 G/DL (ref 12.1–17)
IMM GRANULOCYTES # BLD AUTO: 0 K/UL (ref 0–0.04)
IMM GRANULOCYTES NFR BLD AUTO: 1 % (ref 0–0.5)
LYMPHOCYTES # BLD: 1 K/UL (ref 0.8–3.5)
LYMPHOCYTES NFR BLD: 25 % (ref 12–49)
MCH RBC QN AUTO: 23.5 PG (ref 26–34)
MCHC RBC AUTO-ENTMCNC: 29.8 G/DL (ref 30–36.5)
MCV RBC AUTO: 78.8 FL (ref 80–99)
MONOCYTES # BLD: 0.4 K/UL (ref 0–1)
MONOCYTES NFR BLD: 9 % (ref 5–13)
NEUTS SEG # BLD: 2.3 K/UL (ref 1.8–8)
NEUTS SEG NFR BLD: 59 % (ref 32–75)
NRBC # BLD: 0 K/UL (ref 0–0.01)
NRBC BLD-RTO: 0 PER 100 WBC
PLATELET # BLD AUTO: 151 K/UL (ref 150–400)
PMV BLD AUTO: 8.7 FL (ref 8.9–12.9)
POTASSIUM SERPL-SCNC: 3.5 MMOL/L (ref 3.5–5.1)
PROT SERPL-MCNC: 7.8 G/DL (ref 6.4–8.2)
RBC # BLD AUTO: 3.58 M/UL (ref 4.1–5.7)
RBC MORPH BLD: ABNORMAL
SODIUM SERPL-SCNC: 141 MMOL/L (ref 136–145)
SPECIMEN EXP DATE BLD: NORMAL
SPECIMEN HOLD: NORMAL
WBC # BLD AUTO: 3.9 K/UL (ref 4.1–11.1)

## 2024-08-21 PROCEDURE — 86901 BLOOD TYPING SEROLOGIC RH(D): CPT

## 2024-08-21 PROCEDURE — 80053 COMPREHEN METABOLIC PANEL: CPT

## 2024-08-21 PROCEDURE — 86900 BLOOD TYPING SEROLOGIC ABO: CPT

## 2024-08-21 PROCEDURE — 85025 COMPLETE CBC W/AUTO DIFF WBC: CPT

## 2024-08-21 PROCEDURE — 36415 COLL VENOUS BLD VENIPUNCTURE: CPT

## 2024-08-21 PROCEDURE — 99283 EMERGENCY DEPT VISIT LOW MDM: CPT

## 2024-08-21 PROCEDURE — 86850 RBC ANTIBODY SCREEN: CPT

## 2024-08-21 ASSESSMENT — PAIN - FUNCTIONAL ASSESSMENT
PAIN_FUNCTIONAL_ASSESSMENT: NONE - DENIES PAIN
PAIN_FUNCTIONAL_ASSESSMENT: NONE - DENIES PAIN

## 2024-08-21 NOTE — ED PROVIDER NOTES
[facility] EMERGENCY DEPT  EMERGENCY DEPARTMENT ENCOUNTER      Pt Name: Troy Aguilar  MRN: [id number]  Birthdate 1975  Date of evaluation: 8/21/2024  Provider: Augusto Giles DO    CHIEF COMPLAINT       Chief Complaint   Patient presents with    Fatigue         HISTORY OF PRESENT ILLNESS   (Location/Symptom, Timing/Onset, Context/Setting, Quality, Duration, Modifying Factors, Severity)  Note limiting factors.   48 yo male who arrives for some symptomatic fatigue. Recent admission for anemia where he got blood tranfusions. Was feeling better at the time discharge. Over the last several days has had some increased fatigue and general  malaise, worse with exertion. Patient states that he isn't having SOB or CP, just generally fatigued which is not normal.     History of ETOH abuse but notes over the last 10 weeks he has been much better. He does endorse 4 beers today and yesterday which is \"abnormal\" as of late.     No other complaints.             Review of External Medical Records:     Nursing Notes were reviewed.    REVIEW OF SYSTEMS    (2-9 systems for level 4, 10 or more for level 5)     Review of Systems    Except as noted above the remainder of the review of systems was reviewed and negative.       PAST MEDICAL HISTORY     Past Medical History:   Diagnosis Date    Alcohol abuse     Anxiety and depression     Diabetes mellitus (HCC)     Hyperlipidemia     Hypertension     Hypothyroid     Insomnia     Obese          SURGICAL HISTORY       Past Surgical History:   Procedure Laterality Date    COLONOSCOPY N/A 6/13/2024    COLONOSCOPY DIAGNOSTIC performed by Bunny Lakhani MD at [facility] ENDOSCOPY    COLONOSCOPY N/A 6/13/2024    COLONOSCOPY POLYPECTOMY SNARE/BIOPSY performed by Bunny Lakhani MD at [facility] ENDOSCOPY    UPPER GASTROINTESTINAL ENDOSCOPY N/A 6/13/2024    ESOPHAGOGASTRODUODENOSCOPY performed by Bunny Lakhani MD at [facility] ENDOSCOPY         CURRENT MEDICATIONS       Discharge Medication List as of 8/21/2024  3:03 PM

## 2024-08-21 NOTE — ED TRIAGE NOTES
Patient arrives to the ED for complaints of weakness and increasing fatigue for the past week. Patient reports 3 weeks ago he was admitted here for anemia. Patient had an endoscopy and colonoscopy with negative results.     Denies any pain. Denies any blood in stool.     PMH Diabetes, HTN

## 2025-08-22 ENCOUNTER — CLINICAL DOCUMENTATION (OUTPATIENT)
Age: 50
End: 2025-08-22

## (undated) DEVICE — BLUNTFILL: Brand: MONOJECT

## (undated) DEVICE — BITEBLOCK ENDOSCP 60FR MAXI WHT POLYETH STURDY W/ VELC WVN

## (undated) DEVICE — BLUNTFILL WITH FILTER: Brand: MONOJECT

## (undated) DEVICE — 1200 GUARD II KIT W/5MM TUBE W/O VAC TUBE: Brand: GUARDIAN

## (undated) DEVICE — SET ADMIN 16ML TBNG L100IN 2 Y INJ SITE IV PIGGY BK DISP (ORDER IN MULIPLES OF 48)

## (undated) DEVICE — SYRINGE MEDICAL 3ML CLEAR PLASTIC STANDARD NON CONTROL LUERLOCK TIP DISPOSABLE

## (undated) DEVICE — CATHETER IV 22GA L1IN OD0.8382-0.9144MM ID0.6096-0.6858MM 382523

## (undated) DEVICE — SOLIDIFIER FLD 2OZ 1500CC N DISINF IN BTL DISP SAFESORB

## (undated) DEVICE — CANNULA CUSH AD W/ 14FT TBG

## (undated) DEVICE — SYRINGE MED 5ML STD CLR PLAS LUERLOCK TIP N CTRL DISP

## (undated) DEVICE — KIT COLON W/ 1.1OZ LUB AND 2 END

## (undated) DEVICE — IV STRT KT 3282] LSL INDUSTRIES INC]

## (undated) DEVICE — ELECTRODE,RADIOTRANSLUCENT,FOAM,3PK: Brand: MEDLINE